# Patient Record
Sex: MALE | Race: WHITE | NOT HISPANIC OR LATINO | Employment: FULL TIME | URBAN - NONMETROPOLITAN AREA
[De-identification: names, ages, dates, MRNs, and addresses within clinical notes are randomized per-mention and may not be internally consistent; named-entity substitution may affect disease eponyms.]

---

## 2017-07-05 ENCOUNTER — OFFICE VISIT (OUTPATIENT)
Dept: FAMILY MEDICINE | Facility: CLINIC | Age: 37
End: 2017-07-05
Payer: COMMERCIAL

## 2017-07-05 VITALS
WEIGHT: 315 LBS | OXYGEN SATURATION: 97 % | TEMPERATURE: 97.7 F | SYSTOLIC BLOOD PRESSURE: 136 MMHG | HEART RATE: 78 BPM | BODY MASS INDEX: 42.01 KG/M2 | DIASTOLIC BLOOD PRESSURE: 90 MMHG

## 2017-07-05 DIAGNOSIS — F17.200 TOBACCO DEPENDENCE: ICD-10-CM

## 2017-07-05 DIAGNOSIS — M54.5 ACUTE BILATERAL LOW BACK PAIN, WITH SCIATICA PRESENCE UNSPECIFIED: Primary | ICD-10-CM

## 2017-07-05 DIAGNOSIS — E66.01 MORBID OBESITY, UNSPECIFIED OBESITY TYPE (H): ICD-10-CM

## 2017-07-05 PROCEDURE — 99214 OFFICE O/P EST MOD 30 MIN: CPT | Performed by: FAMILY MEDICINE

## 2017-07-05 RX ORDER — NAPROXEN 500 MG/1
500 TABLET ORAL
Qty: 90 TABLET | Refills: 1 | Status: SHIPPED | OUTPATIENT
Start: 2017-07-05 | End: 2023-12-06

## 2017-07-05 RX ORDER — CYCLOBENZAPRINE HCL 10 MG
5-10 TABLET ORAL 3 TIMES DAILY PRN
Qty: 30 TABLET | Refills: 1 | Status: SHIPPED | OUTPATIENT
Start: 2017-07-05 | End: 2017-08-08

## 2017-07-05 NOTE — NURSING NOTE
"Chief Complaint   Patient presents with     Back Pain       Initial /90  Pulse 78  Temp 97.7  F (36.5  C) (Tympanic)  Wt (!) 392 lb (177.8 kg)  SpO2 97%  BMI 42.01 kg/m2 Estimated body mass index is 42.01 kg/(m^2) as calculated from the following:    Height as of 3/30/10: 6' 9\" (2.057 m).    Weight as of this encounter: 392 lb (177.8 kg).  Medication Reconciliation: complete    "

## 2017-07-05 NOTE — PROGRESS NOTES
"  SUBJECTIVE:                                                    Christopher Mejia is a 37 year old male who presents to clinic today for the following health issues:      Back Pain       Duration: x4 days        Specific cause: fall     Description:   Location of pain: low back -middle, fell forward accidentally last weekend, no head injury or loss of consciousness  Character of pain: \"pulling\" intermittent   Pain radiation:radiates into the right leg and radiates into the left leg  New numbness or weakness in legs, not attributed to pain:  No; some weakness when pt tried to stand straight     Intensity: moderate (6/10 intensity)    History:   Pain interferes with job: YES,   History of back problems: yes, was in PT in December of 2016  Any previous MRI or X-rays: None  Sees a specialist for back pain:  No  Therapies tried without relief: IBU    Alleviating factors:   Improved by: NSAIDs and stretch      Precipitating factors:  Worsened by: Lifting and Standing    Functional and Psychosocial Screen (Soni STarT Back):      Not performed today          Accompanying Signs & Symptoms:  Risk of Fracture:  None  Risk of Cauda Equina:  None  Risk of Infection:  None  Risk of Cancer:  None  Risk of Ankylosing Spondylitis:  Onset at age <35, male, AND morning back stiffness. no         Problem list and histories reviewed & adjusted, as indicated.  Additional history: as documented    Patient Active Problem List   Diagnosis     Body mass index 40 and over, adult     CARDIOVASCULAR SCREENING; LDL GOAL LESS THAN 130     ELO (obstructive sleep apnea)     Gastroesophageal reflux disease without esophagitis     Morbid obesity (H)     Tobacco dependence     Past Surgical History:   Procedure Laterality Date     APPENDECTOMY  1989       Social History   Substance Use Topics     Smoking status: Current Every Day Smoker     Packs/day: 0.50     Types: Cigarettes     Smokeless tobacco: Not on file     Alcohol use Yes      Comment: " occ.     Family History   Problem Relation Age of Onset     CANCER Mother      thyroid     Respiratory Paternal Grandmother      Cardiovascular Paternal Grandmother      CANCER Paternal Grandfather          Current Outpatient Prescriptions   Medication Sig Dispense Refill     RANITIDINE HCL PO Take by mouth daily       naproxen (NAPROSYN) 500 MG tablet Take 1 tablet (500 mg) by mouth 3 times daily (with meals) 90 tablet 1     cyclobenzaprine (FLEXERIL) 10 MG tablet Take 0.5-1 tablets (5-10 mg) by mouth 3 times daily as needed for muscle spasms 30 tablet 1     IBUPROFEN 200 200 MG OR TABS 1 TABLET EVERY 4 TO 6 HOURS AS NEEDED       naproxen (NAPROSYN) 500 MG tablet Take 1 tablet (500 mg) by mouth 3 times daily (with meals) 30 tablet 1     PREVACID 15 MG OR CPDR 1 CAPSULE DAILY BEFORE EATING       Allergies   Allergen Reactions     Amoxicillin Hives     Recent Labs   Lab Test 05/13/16  03/30/10   1029   A1C   --   5.5   LDL  97  115   HDL  40  39*   TRIG  91  243*   CR   --   1.03   GFRESTIMATED   --   85   GFRESTBLACK   --   >90   POTASSIUM   --   4.7   TSH  2.359   --       BP Readings from Last 3 Encounters:   07/05/17 136/90   11/30/16 130/84   11/04/16 132/86    Wt Readings from Last 3 Encounters:   07/05/17 (!) 392 lb (177.8 kg)   11/30/16 (!) 384 lb (174.2 kg)   11/04/16 (!) 387 lb (175.5 kg)                  Labs reviewed in EPIC    Reviewed and updated as needed this visit by clinical staff       Reviewed and updated as needed this visit by Provider         ROS:  Constitutional, HEENT, cardiovascular, pulmonary, gi and gu systems are negative, except as otherwise noted.    OBJECTIVE:     /90  Pulse 78  Temp 97.7  F (36.5  C) (Tympanic)  Wt (!) 392 lb (177.8 kg)  SpO2 97%  BMI 42.01 kg/m2  Body mass index is 42.01 kg/(m^2).  GENERAL: alert, no distress and obese  NECK: no adenopathy, no asymmetry, masses, or scars and thyroid normal to palpation  RESP: lungs clear to auscultation - no rales, rhonchi  or wheezes  CV: regular rate and rhythm, normal S1 S2, no S3 or S4, no murmur, click or rub, no peripheral edema and peripheral pulses strong  ABDOMEN: soft, nontender, no hepatosplenomegaly, no masses and bowel sounds normal  MS: Mild lumbar paraspinal tenderness, no swelling or skin discoloration noted, SLR equal, reflexes 2+, sensation to touch and pressure intact  CNS: Grossly intact    ASSESSMENT/PLAN:           ICD-10-CM    1. Acute bilateral low back pain, with sciatica presence unspecified M54.5 naproxen (NAPROSYN) 500 MG tablet     cyclobenzaprine (FLEXERIL) 10 MG tablet     MR Lumbar Spine w/o Contrast   2. Morbid obesity, unspecified obesity type (H) E66.01    3. Tobacco dependence F17.200      MRI ordered in view of recurrent flare ups. Naproxen and Flexeril prescribed, common side effects discussed. Recommended to adapt healthy lifestyle modifications including regular exercise, balanced diet and stopping smoking habit. Discussed in detail significant cardiovascular risks involved with obesity and tobacco use. Written information provided as below. Patient understood and in agreement with the above plan. All questions answered.      MEDICATIONS:   Orders Placed This Encounter   Medications     RANITIDINE HCL PO     Sig: Take by mouth daily     naproxen (NAPROSYN) 500 MG tablet     Sig: Take 1 tablet (500 mg) by mouth 3 times daily (with meals)     Dispense:  90 tablet     Refill:  1     cyclobenzaprine (FLEXERIL) 10 MG tablet     Sig: Take 0.5-1 tablets (5-10 mg) by mouth 3 times daily as needed for muscle spasms     Dispense:  30 tablet     Refill:  1     Patient Instructions       Back Care Tips    Caring for your back  These are things you can do to prevent a recurrence of acute back pain and to reduce symptoms from chronic back pain:    Maintain a healthy weight. If you are overweight, losing weight will help most types of back pain.    Exercise is an important part of recovery from most types of  back pain. The muscles behind and in front of the spine support the back. This means strengthening both the back muscles and the abdominal muscles will provide better support for your spine.     Swimming and brisk walking are good overall exercises to improve your fitness level.    Practice safe lifting methods (below).    Practice good posture when sitting, standing and walking. Avoid prolonged sitting. This puts more stress on the lower back than standing or walking.    Wear quality shoes with sufficient arch support. Foot and ankle alignment can affect back symptoms. Women should avoid wearing high heels.    Therapeutic massage can help relax the back muscles without stretching them.    During the first 24 to 72 hours after an acute injury or flare-up of chronic back pain, apply an ice pack to the painful area for 20 minutes and then remove it for 20 minutes, over a period of 60 to 90 minutes, or several times a day. As a safety precaution, do not use a heating pad at bedtime. Sleeping on a heating pad can lead to skin burns or tissue damage.    You can alternate ice and heat therapies.  Medications  Talk to your healthcare provider before using medicines, especially if you have other medical problems or are taking other medicines.    You may use acetaminophen or ibuprofen to control pain, unless your healthcare provider prescribed other pain medicine. If you have chronic conditions like diabetes, liver or kidney disease, stomach ulcers, or gastrointestinal bleeding, or are taking blood thinners, talk with your healthcare provider before taking any medicines.    Be careful if you are given prescription pain medicines, narcotics, or medicine for muscle spasm. They can cause drowsiness, affect your coordination, reflexes, and judgment. Do not drive or operate heavy machinery while taking these types of medicines. Take prescription pain medicine only as prescribed by your healthcare provider.  Lumbar stretch  Here  is a simple stretching exercise that will help relax muscle spasm and keep your back more limber. If exercise makes your back pain worse, don t do it.    Lie on your back with your knees bent and both feet on the ground.    Slowly raise your left knee to your chest as you flatten your lower back against the floor. Hold for 5 seconds.    Relax and repeat the exercise with your right knee.    Do 10 of these exercises for each leg.  Safe lifting method    Don t bend over at the waist to lift an object off the floor.  Instead, bend your knees and hips in a squat.     Keep your back and head upright    Hold the object close to your body, directly in front of you.    Straighten your legs to lift the object.     Lower the object to the floor in the reverse fashion.    If you must slide something across the floor, push it.  Posture tips  Sitting  Sit in chairs with straight backs or low-back support. Keep your knees lower than your hips, with your feet flat on the floor.  When driving, sit up straight. Adjust the seat forward so you are not leaning toward the steering wheel.  A small pillow or rolled towel behind your lower back may help if you are driving long distances.   Standing  When standing for long periods, shift most of your weight to one leg at a time. Alternate legs every few minutes.   Sleeping  The best way to sleep is on your side with your knees bent. Put a low pillow under your head to support your neck in a neutral spine position. Avoid thick pillows that bend your neck to one side. Put a pillow between your legs to further relax your lower back. If you sleep on your back, put pillows under your knees to support your legs in a slightly flexed position. Use a firm mattress. If your mattress sags, replace it, or use a 1/2-inch plywood board under the mattress to add support.  Follow-up care  Follow up with your healthcare provider, or as advised.  If X-rays, a CT scan or an MRI scan were taken, they will be  reviewed by a radiologist. You will be notified of any new findings that may affect your care.  Call 911  Seek emergency medical care if any of the following occur:    Trouble breathing    Confusion    Very drowsy    Fainting or loss of consciousness    Rapid or very slow heart rate    Loss of  bowel or bladder control  When to seek medical care  Call your healthcare provider if any of the following occur:    Pain becomes worse or spreads to your arms or legs    Weakness or numbness in one or both arms or legs    Numbness in the groin area  Date Last Reviewed: 6/1/2016 2000-2017 [email or web address]. [address] [place] [address]. All rights reserved. This information is not intended as a substitute for professional medical care. Always follow your healthcare professional's instructions.        Relieving Back Pain  Back pain is a common problem. You can strain back muscles by lifting too much weight or just by moving the wrong way. Back strain can be uncomfortable, even painful. And it can take weeks or months to improve. To help yourself feel better and prevent future back strains, try these tips.  Important Note: Do not give aspirin to children or teens without first discussing it with your healthcare provider.      ? Ice    Ice reduces muscle pain and swelling. It helps most during the first 24 to 48 hours after an injury.    Wrap an ice pack or a bag of frozen peas in a thin towel. (Never place ice directly on your skin.)    Place the ice where your back hurts the most.    Don t ice for more than 20 minutes at a time.    You can use ice several times a day.  ? Medicines  Over-the-counter pain relievers can include acetaminophen and anti-inflammatory medicines, which includes aspirin or ibuprofen. They can help ease discomfort. Some also reduce swelling.    Tell your healthcare provider about any medicines you are already taking.    Take medicines only as directed.  ? Heat  After the first 48  hours, heat can relax sore muscles and improve blood flow.    Try a warm bath or shower. Or use a heating pad set on low. To prevent a burn, keep a cloth between you and the heating pad.    Don t use a heating pad for more than 15 minutes at a time. Never sleep on a heating pad.  Date Last Reviewed: 9/1/2015 2000-2017 [employer]. [address] [place] [address], [place] [address]. All rights reserved. This information is not intended as a substitute for professional medical care. Always follow your healthcare professional's instructions.        4 Steps for Eating Healthier  Changing the way you eat can improve your health. It can lower your cholesterol and blood pressure, and help you stay at a healthy weight. Your diet doesn t have to be bland and boring to be healthy. Just watch your calories and follow these steps:    1. Eat fewer unhealthy fats    Choose more fish and lean meats instead of fatty cuts of meat.    Skip butter and lard, and use less margarine.    Pass on foods that have palm, coconut, or hydrogenated oils.    Eat fewer high-fat dairy foods like cheese, ice cream, and whole milk.    Get a heart-healthy cookbook and try some low-fat recipes.  2. Go light on salt    Keep the saltshaker off the table.    Limit high-salt ingredients, such as soy sauce, bouillon, and garlic salt.    Instead of adding salt when cooking, season your food with herbs and flavorings. Try lemon, garlic, and onion.    Limit convenience foods, such as boxed or canned foods and restaurant food.    Read food labels and choose lower-sodium options.  3. Limit sugar    Pause before you add sugars to pancakes, cereal, coffee, or tea. This includes white and brown table sugar, syrup, honey, and molasses. Cut your usual amount by half.    Use non-sugar sweeteners. Stevia, aspartame, and sucralose can satisfy a sweet tooth without adding calories.    Swap out sugar-filled soda and other drinks. Buy sugar-free or low-calorie  beverages. Remember water is always the best choice.    Read labels and choose foods with less added sugar. Keep in mind that dairy foods and foods with fruit will have some natural sugar.    Cut the sugar in recipes by 1/3 to 1/2. Boost the flavor with extracts like almond, vanilla, or orange. Or add spices such as cinnamon or nutmeg.  4. Eat more fiber    Eat fresh fruits and vegetables every day.    Boost your diet with whole grains. Go for oats, whole-grain rice, and bran.    Add beans and lentils to your meals.    Drink more water to match your fiber increase. This is to help prevent constipation.  Date Last Reviewed: 5/11/2015 2000-2017 [employer]. [address] [place] [address], [place] [address]. All rights reserved. This information is not intended as a substitute for professional medical care. Always follow your healthcare professional's instructions.        Weight Management: Exercise and Activity  Studies show that people who exercise are the most likely to lose weight and keep it off. Exercise burns calories. It helps build muscle to make your body stronger. Make exercise an important part of your weight-management plan.    Make activity part of your day  You may not think you have the time to exercise. But you can work activity into your daily life--you just need to be committed. Take 10 minutes out of your lunch hour to take a walk. Walk to the [employer] to get your paper instead of having it delivered. Make it a habit to take the stairs instead of the elevator. Park in a far away parking spot instead of the closest. You ll be surprised at how fast these little changes can make a difference.  Some people really cannot walk very far, and tire out quickly with exercise. Instead of becoming discouraged, resolve to do what you can do, and work to make that a regular frequent habit.   The benefits of exercise  Exercise offers many benefits including:     Exercise increases your metabolism (the  speed at which your body burns calories).    Regular exercise can increase the amount of muscle in your body. Muscle burns calories faster than fat. The more muscle you have, the more calories you burn.    Exercise gives you energy and curbs your appetite.    Exercise decreases stress and helps you sleep better.  Make exercise fun  Exercise can be fun. Choose an activity you enjoy. You may even get a friend to do it with you:    Take a resistance-training or aerobics class    Join a team sport    Take a dance class    Walk the dog    Ride a bike  If you have health problems, be sure to ask your healthcare provider before you start an exercise program. Have a  help you develop a plan that s safe for you.   Date Last Reviewed: 2/4/2016 2000-2017 [employer]. [address] [place] [address], [place] [address]. All rights reserved. This information is not intended as a substitute for professional medical care. Always follow your healthcare professional's instructions.        How to Quit Smoking  Smoking is one of the hardest habits to break. About half of all people who have ever smoked have been able to quit. Most people who still smoke want to quit. Here are some of the best ways to stop smoking.    Keep trying  It takes most smokers about eight tries before they can quit entirely. It s important not to give up.  Go cold turkey  Most former smokers quit cold turkey (all at once). Trying to cut back gradually doesn't seem to work as well, perhaps because it continues the smoking habit. Also, it is possible to inhale more while smoking fewer cigarettes. This results in the same amount of nicotine in your body!  Get support  Support programs can be a big help, especially for heavy smokers. These groups offer lectures, ways to change behavior, and peer support. Here are some ways to find a support program:    Free national quitline: 800QUIT-NOW (792-939-9750).    [facility] quit-smoking  "programs.    American Lung Association: (476.286.5858).    American Cancer Society (744-009-3487).  Support at home is important too. Nonsmokers can offer praise and encouragement. If the smoker in your life finds it hard to quit, encourage them to keep trying!  Over-the-counter medicines  Nicotine replacement therapy may make quitting easier. Certain aids, such as the nicotine patch, gum, and lozenges, are available without a prescription. It is best to use these under a doctor s care, though. The skin patch provides a steady supply of nicotine. Nicotine gum and lozenges give temporary bursts of low levels of nicotine. Both methods reduce the craving for cigarettes. Warning: If you have nausea, vomiting, dizziness, weakness, or a fast heartbeat, stop using these products and see your doctor.  Prescription medicines  After reviewing your smoking patterns and prior attempts to quit, your doctor may offer a prescription medicine such as bupropion, varenicline, a nicotine inhaler, or nasal spray. Each has advantages and side effects. Your doctor can review these with you.  Health benefits of quitting  The benefits of quitting start right away and keep improving the longer you go without smoking. These benefits occur at any age.  So whether you are 17 or 70, quitting is a good decision. Some of the benefits include:    20 minutes: Blood pressure and pulse return to normal.    8 hours: Oxygen levels return to normal.    2 days: Ability to smell and taste begin to improve as damaged nerves regrow.    2 to 3 weeks: Circulation and lung function improve.    1 to 9 months: Coughing, congestion, and shortness of breath decrease; tiredness decreases.    1 year: Risk of heart attack decreases by half.    5 years: Risk of lung cancer decreases by half; risk of stroke becomes the same as a nonsmoker s.  For more on how to quit smoking, try these online resources:     Smokefree.gov    \"Clearing the Air\" booklet from the National " Cancer [employer]: smokefree.gov/sites/default/files/pdf/clearing-the-air-accessible.pdf  Date Last Reviewed: 4/28/2015 2000-2017 [employer]. [address] [place] [address], [place] [address]. All rights reserved. This information is not intended as a substitute for professional medical care. Always follow your healthcare professional's instructions.            Von Cruz MD  [facility]

## 2017-07-05 NOTE — MR AVS SNAPSHOT
After Visit Summary   7/5/2017    Christopher eMjia    MRN: [id number]           Patient Information     Date Of Birth          1980        Visit Information        Provider Department      7/5/2017 9:40 AM Von Cruz MD [facility]        Today's Diagnoses     Acute bilateral low back pain, with sciatica presence unspecified    -  1    Morbid obesity, unspecified obesity type (H)        Tobacco dependence          Care Instructions      Back Care Tips    Caring for your back  These are things you can do to prevent a recurrence of acute back pain and to reduce symptoms from chronic back pain:    Maintain a healthy weight. If you are overweight, losing weight will help most types of back pain.    Exercise is an important part of recovery from most types of back pain. The muscles behind and in front of the spine support the back. This means strengthening both the back muscles and the abdominal muscles will provide better support for your spine.     Swimming and brisk walking are good overall exercises to improve your fitness level.    Practice safe lifting methods (below).    Practice good posture when sitting, standing and walking. Avoid prolonged sitting. This puts more stress on the lower back than standing or walking.    Wear quality shoes with sufficient arch support. Foot and ankle alignment can affect back symptoms. Women should avoid wearing high heels.    Therapeutic massage can help relax the back muscles without stretching them.    During the first 24 to 72 hours after an acute injury or flare-up of chronic back pain, apply an ice pack to the painful area for 20 minutes and then remove it for 20 minutes, over a period of 60 to 90 minutes, or several times a day. As a safety precaution, do not use a heating pad at bedtime. Sleeping on a heating pad can lead to skin burns or tissue damage.    You can alternate ice and heat therapies.  Medications  Talk to your  healthcare provider before using medicines, especially if you have other medical problems or are taking other medicines.    You may use acetaminophen or ibuprofen to control pain, unless your healthcare provider prescribed other pain medicine. If you have chronic conditions like diabetes, liver or kidney disease, stomach ulcers, or gastrointestinal bleeding, or are taking blood thinners, talk with your healthcare provider before taking any medicines.    Be careful if you are given prescription pain medicines, narcotics, or medicine for muscle spasm. They can cause drowsiness, affect your coordination, reflexes, and judgment. Do not drive or operate heavy machinery while taking these types of medicines. Take prescription pain medicine only as prescribed by your healthcare provider.  Lumbar stretch  Here is a simple stretching exercise that will help relax muscle spasm and keep your back more limber. If exercise makes your back pain worse, don t do it.    Lie on your back with your knees bent and both feet on the ground.    Slowly raise your left knee to your chest as you flatten your lower back against the floor. Hold for 5 seconds.    Relax and repeat the exercise with your right knee.    Do 10 of these exercises for each leg.  Safe lifting method    Don t bend over at the waist to lift an object off the floor.  Instead, bend your knees and hips in a squat.     Keep your back and head upright    Hold the object close to your body, directly in front of you.    Straighten your legs to lift the object.     Lower the object to the floor in the reverse fashion.    If you must slide something across the floor, push it.  Posture tips  Sitting  Sit in chairs with straight backs or low-back support. Keep your knees lower than your hips, with your feet flat on the floor.  When driving, sit up straight. Adjust the seat forward so you are not leaning toward the steering wheel.  A small pillow or rolled towel behind your lower  back may help if you are driving long distances.   Standing  When standing for long periods, shift most of your weight to one leg at a time. Alternate legs every few minutes.   Sleeping  The best way to sleep is on your side with your knees bent. Put a low pillow under your head to support your neck in a neutral spine position. Avoid thick pillows that bend your neck to one side. Put a pillow between your legs to further relax your lower back. If you sleep on your back, put pillows under your knees to support your legs in a slightly flexed position. Use a firm mattress. If your mattress sags, replace it, or use a 1/2-inch plywood board under the mattress to add support.  Follow-up care  Follow up with your healthcare provider, or as advised.  If X-rays, a CT scan or an MRI scan were taken, they will be reviewed by a radiologist. You will be notified of any new findings that may affect your care.  Call 911  Seek emergency medical care if any of the following occur:    Trouble breathing    Confusion    Very drowsy    Fainting or loss of consciousness    Rapid or very slow heart rate    Loss of  bowel or bladder control  When to seek medical care  Call your healthcare provider if any of the following occur:    Pain becomes worse or spreads to your arms or legs    Weakness or numbness in one or both arms or legs    Numbness in the groin area  Date Last Reviewed: 6/1/2016 2000-2017 [employer]. [address] [place] [address]. All rights reserved. This information is not intended as a substitute for professional medical care. Always follow your healthcare professional's instructions.        Relieving Back Pain  Back pain is a common problem. You can strain back muscles by lifting too much weight or just by moving the wrong way. Back strain can be uncomfortable, even painful. And it can take weeks or months to improve. To help yourself feel better and prevent future back strains, try these  tips.  Important Note: Do not give aspirin to children or teens without first discussing it with your healthcare provider.      ? Ice    Ice reduces muscle pain and swelling. It helps most during the first 24 to 48 hours after an injury.    Wrap an ice pack or a bag of frozen peas in a thin towel. (Never place ice directly on your skin.)    Place the ice where your back hurts the most.    Don t ice for more than 20 minutes at a time.    You can use ice several times a day.  ? Medicines  Over-the-counter pain relievers can include acetaminophen and anti-inflammatory medicines, which includes aspirin or ibuprofen. They can help ease discomfort. Some also reduce swelling.    Tell your healthcare provider about any medicines you are already taking.    Take medicines only as directed.  ? Heat  After the first 48 hours, heat can relax sore muscles and improve blood flow.    Try a warm bath or shower. Or use a heating pad set on low. To prevent a burn, keep a cloth between you and the heating pad.    Don t use a heating pad for more than 15 minutes at a time. Never sleep on a heating pad.  Date Last Reviewed: 9/1/2015 2000-2017 [employer]. [address] [place] [address]. All rights reserved. This information is not intended as a substitute for professional medical care. Always follow your healthcare professional's instructions.        4 Steps for Eating Healthier  Changing the way you eat can improve your health. It can lower your cholesterol and blood pressure, and help you stay at a healthy weight. Your diet doesn t have to be bland and boring to be healthy. Just watch your calories and follow these steps:    1. Eat fewer unhealthy fats    Choose more fish and lean meats instead of fatty cuts of meat.    Skip butter and lard, and use less margarine.    Pass on foods that have palm, coconut, or hydrogenated oils.    Eat fewer high-fat dairy foods like cheese, ice cream, and whole milk.    Get  a heart-healthy cookbook and try some low-fat recipes.  2. Go light on salt    Keep the saltshaker off the table.    Limit high-salt ingredients, such as soy sauce, bouillon, and garlic salt.    Instead of adding salt when cooking, season your food with herbs and flavorings. Try lemon, garlic, and onion.    Limit convenience foods, such as boxed or canned foods and restaurant food.    Read food labels and choose lower-sodium options.  3. Limit sugar    Pause before you add sugars to pancakes, cereal, coffee, or tea. This includes white and brown table sugar, syrup, honey, and molasses. Cut your usual amount by half.    Use non-sugar sweeteners. Stevia, aspartame, and sucralose can satisfy a sweet tooth without adding calories.    Swap out sugar-filled soda and other drinks. Buy sugar-free or low-calorie beverages. Remember water is always the best choice.    Read labels and choose foods with less added sugar. Keep in mind that dairy foods and foods with fruit will have some natural sugar.    Cut the sugar in recipes by 1/3 to 1/2. Boost the flavor with extracts like almond, vanilla, or orange. Or add spices such as cinnamon or nutmeg.  4. Eat more fiber    Eat fresh fruits and vegetables every day.    Boost your diet with whole grains. Go for oats, whole-grain rice, and bran.    Add beans and lentils to your meals.    Drink more water to match your fiber increase. This is to help prevent constipation.  Date Last Reviewed: 5/11/2015 2000-2017 [employer]. [address] [place] [address], [place] [address]. All rights reserved. This information is not intended as a substitute for professional medical care. Always follow your healthcare professional's instructions.        Weight Management: Exercise and Activity  Studies show that people who exercise are the most likely to lose weight and keep it off. Exercise burns calories. It helps build muscle to make your body stronger. Make exercise an important part of  your weight-management plan.    Make activity part of your day  You may not think you have the time to exercise. But you can work activity into your daily life--you just need to be committed. Take 10 minutes out of your lunch hour to take a walk. Walk to the [employer] to get your paper instead of having it delivered. Make it a habit to take the stairs instead of the elevator. Park in a far away parking spot instead of the closest. You ll be surprised at how fast these little changes can make a difference.  Some people really cannot walk very far, and tire out quickly with exercise. Instead of becoming discouraged, resolve to do what you can do, and work to make that a regular frequent habit.   The benefits of exercise  Exercise offers many benefits including:     Exercise increases your metabolism (the speed at which your body burns calories).    Regular exercise can increase the amount of muscle in your body. Muscle burns calories faster than fat. The more muscle you have, the more calories you burn.    Exercise gives you energy and curbs your appetite.    Exercise decreases stress and helps you sleep better.  Make exercise fun  Exercise can be fun. Choose an activity you enjoy. You may even get a friend to do it with you:    Take a resistance-training or aerobics class    Join a team sport    Take a dance class    Walk the dog    Ride a bike  If you have health problems, be sure to ask your healthcare provider before you start an exercise program. Have a  help you develop a plan that s safe for you.   Date Last Reviewed: 2/4/2016 2000-2017 [employer]. [address] [place] [address], [place] [address]. All rights reserved. This information is not intended as a substitute for professional medical care. Always follow your healthcare professional's instructions.        How to Quit Smoking  Smoking is one of the hardest habits to break. About half of all people who have ever smoked have  been able to quit. Most people who still smoke want to quit. Here are some of the best ways to stop smoking.    Keep trying  It takes most smokers about eight tries before they can quit entirely. It s important not to give up.  Go cold turkey  Most former smokers quit cold turkey (all at once). Trying to cut back gradually doesn't seem to work as well, perhaps because it continues the smoking habit. Also, it is possible to inhale more while smoking fewer cigarettes. This results in the same amount of nicotine in your body!  Get support  Support programs can be a big help, especially for heavy smokers. These groups offer lectures, ways to change behavior, and peer support. Here are some ways to find a support program:    Free national quitline: 800-QUIT-NOW (577-938-3870).    [facility] quit-smoking programs.    American Lung Association: (585.519.5050).    American Cancer Society (664-222-1850).  Support at home is important too. Nonsmokers can offer praise and encouragement. If the smoker in your life finds it hard to quit, encourage them to keep trying!  Over-the-counter medicines  Nicotine replacement therapy may make quitting easier. Certain aids, such as the nicotine patch, gum, and lozenges, are available without a prescription. It is best to use these under a doctor s care, though. The skin patch provides a steady supply of nicotine. Nicotine gum and lozenges give temporary bursts of low levels of nicotine. Both methods reduce the craving for cigarettes. Warning: If you have nausea, vomiting, dizziness, weakness, or a fast heartbeat, stop using these products and see your doctor.  Prescription medicines  After reviewing your smoking patterns and prior attempts to quit, your doctor may offer a prescription medicine such as bupropion, varenicline, a nicotine inhaler, or nasal spray. Each has advantages and side effects. Your doctor can review these with you.  Health benefits of quitting  The benefits of quitting  "start right away and keep improving the longer you go without smoking. These benefits occur at any age.  So whether you are 17 or 70, quitting is a good decision. Some of the benefits include:    20 minutes: Blood pressure and pulse return to normal.    8 hours: Oxygen levels return to normal.    2 days: Ability to smell and taste begin to improve as damaged nerves regrow.    2 to 3 weeks: Circulation and lung function improve.    1 to 9 months: Coughing, congestion, and shortness of breath decrease; tiredness decreases.    1 year: Risk of heart attack decreases by half.    5 years: Risk of lung cancer decreases by half; risk of stroke becomes the same as a nonsmoker s.  For more on how to quit smoking, try these online resources:     Smokefree.gov    \"Clearing the Air\" booklet from the National [employer]yer: smokefree.gov/sites/default/files/pdf/clearing-the-air-accessible.pdf  Date Last Reviewed: 4/28/2015 2000-2017 [employer]. [address] [place] [address]. All rights reserved. This information is not intended as a substitute for professional medical care. Always follow your healthcare professional's instructions.                Follow-ups after your visit        Future tests that were ordered for you today     Open Future Orders        Priority Expected Expires Ordered    MR Lumbar Spine w/o Contrast Routine  7/5/2018 7/5/2017            Who to contact     If you have questions or need follow up information about today's clinic visit or your schedule please contact [facility] directly at [phone].  Normal or non-critical lab and imaging results will be communicated to you by MyChart, letter or phone within 4 business days after the clinic has received the results. If you do not hear from us within 7 days, please contact the clinic through MyChart or phone. If you have a critical or abnormal lab result, we will notify you by phone as soon as possible.  Submit " "refill requests through [employer] or call your pharmacy and they will forward the refill request to us. Please allow 3 business days for your refill to be completed.          Additional Information About Your Visit        [employer] Information     [employer] lets you send messages to your doctor, view your test results, renew your prescriptions, schedule appointments and more. To sign up, go to [email or web address] . Click on \"Log in\" on the left side of the screen, which will take you to the Welcome page. Then click on \"Sign up Now\" on the right side of the page.     You will be asked to enter the access code listed below, as well as some personal information. Please follow the directions to create your username and password.     Your access code is: [email or web address]  Expires: 10/3/2017 10:08 AM     Your access code will  in 90 days. If you need help or a new code, please call your [place] clinic or [phone].        Care EveryWhere ID     This is your Care EveryWhere ID. This could be used by other organizations to access your [place] medical records  [id number]        Your Vitals Were     Pulse Temperature Pulse Oximetry BMI (Body Mass Index)          78 97.7  F (36.5  C) (Tympanic) 97% 42.01 kg/m2         Blood Pressure from Last 3 Encounters:   17 136/90   16 130/84   16 132/86    Weight from Last 3 Encounters:   17 (!) 392 lb (177.8 kg)   16 (!) 384 lb (174.2 kg)   16 (!) 387 lb (175.5 kg)                 Today's Medication Changes          These changes are accurate as of: 17 10:08 AM.  If you have any questions, ask your nurse or doctor.               Start taking these medicines.        Dose/Directions    cyclobenzaprine 10 MG tablet   Commonly known as:  FLEXERIL   Used for:  Acute bilateral low back pain, with sciatica presence unspecified        Dose:  5-10 mg   Take 0.5-1 tablets (5-10 mg) by mouth 3 times daily as needed for muscle spasms   Quantity:  30 " tablet   Refills:  1         These medicines have changed or have updated prescriptions.        Dose/Directions    * naproxen 500 MG tablet   Commonly known as:  NAPROSYN   This may have changed:  Another medication with the same name was added. Make sure you understand how and when to take each.   Used for:  Bilateral low back pain without sciatica, unspecified chronicity        Dose:  500 mg   Take 1 tablet (500 mg) by mouth 3 times daily (with meals)   Quantity:  30 tablet   Refills:  1       * naproxen 500 MG tablet   Commonly known as:  NAPROSYN   This may have changed:  You were already taking a medication with the same name, and this prescription was added. Make sure you understand how and when to take each.   Used for:  Acute bilateral low back pain, with sciatica presence unspecified        Dose:  500 mg   Take 1 tablet (500 mg) by mouth 3 times daily (with meals)   Quantity:  90 tablet   Refills:  1       * Notice:  This list has 2 medication(s) that are the same as other medications prescribed for you. Read the directions carefully, and ask your doctor or other care provider to review them with you.         Where to get your medicines      These medications were sent to [id number] - [place], [place] - [address]  [address] [facility] [address] [address]     Phone:  [phone]     cyclobenzaprine 10 MG tablet    naproxen 500 MG tablet                Primary Care Provider Office Phone # Fax #    Gonzales Villalpando [phone]       [facility] [address] [facility] [address]        Equal Access to Services     [facility] AH: Hadii josué barbouro Sosally, waaxda luqadaha, qaybta kaalmada adeegyada, salvador benites. So [facility] [phone].    ATENCIÓN: Si habla español, tiene a matos disposición servicios gratuitos de asistencia lingüística. Jennifer al [phone].    We comply with applicable federal civil rights laws and  [place] laws. We do not discriminate on the basis of race, color, national origin, age, disability sex, sexual orientation or gender identity.            Thank you!     Thank you for choosing [facility]  for your care. Our goal is always to provide you with excellent care. Hearing back from our patients is one way we can continue to improve our services. Please take a few minutes to complete the written survey that you may receive in the mail after your visit with us. Thank you!             Your Updated Medication List - Protect others around you: Learn how to safely use, store and throw away your medicines at www.disposemymeds.org.          This list is accurate as of: 7/5/17 10:08 AM.  Always use your most recent med list.                   Brand Name Dispense Instructions for use Diagnosis    cyclobenzaprine 10 MG tablet    FLEXERIL    30 tablet    Take 0.5-1 tablets (5-10 mg) by mouth 3 times daily as needed for muscle spasms    Acute bilateral low back pain, with sciatica presence unspecified       IBUPROFEN 200 200 MG Tabs      1 TABLET EVERY 4 TO 6 HOURS AS NEEDED        * naproxen 500 MG tablet    NAPROSYN    30 tablet    Take 1 tablet (500 mg) by mouth 3 times daily (with meals)    Bilateral low back pain without sciatica, unspecified chronicity       * naproxen 500 MG tablet    NAPROSYN    90 tablet    Take 1 tablet (500 mg) by mouth 3 times daily (with meals)    Acute bilateral low back pain, with sciatica presence unspecified       PREVACID 15 MG CR capsule   Generic drug:  LANsoprazole      1 CAPSULE DAILY BEFORE EATING        RANITIDINE HCL PO      Take by mouth daily        * Notice:  This list has 2 medication(s) that are the same as other medications prescribed for you. Read the directions carefully, and ask your doctor or other care provider to review them with you.

## 2017-07-05 NOTE — LETTER
[facility]  [address] [facility] [address]  [phone]    July 5, 2017        Christopher Mejia  [address] [address]        To whom it may concern:          Christopher Mejia was seen on July 5, 2017. Kindly excuse his absence on 07/02, 07/05 and 07/06/2017. He may can return to work on 07/07/2017 if feels better clinically.         Please contact me for questions or concerns.          Sincerely,          Von Cruz MD

## 2017-07-05 NOTE — PATIENT INSTRUCTIONS
Back Care Tips    Caring for your back  These are things you can do to prevent a recurrence of acute back pain and to reduce symptoms from chronic back pain:    Maintain a healthy weight. If you are overweight, losing weight will help most types of back pain.    Exercise is an important part of recovery from most types of back pain. The muscles behind and in front of the spine support the back. This means strengthening both the back muscles and the abdominal muscles will provide better support for your spine.     Swimming and brisk walking are good overall exercises to improve your fitness level.    Practice safe lifting methods (below).    Practice good posture when sitting, standing and walking. Avoid prolonged sitting. This puts more stress on the lower back than standing or walking.    Wear quality shoes with sufficient arch support. Foot and ankle alignment can affect back symptoms. Women should avoid wearing high heels.    Therapeutic massage can help relax the back muscles without stretching them.    During the first 24 to 72 hours after an acute injury or flare-up of chronic back pain, apply an ice pack to the painful area for 20 minutes and then remove it for 20 minutes, over a period of 60 to 90 minutes, or several times a day. As a safety precaution, do not use a heating pad at bedtime. Sleeping on a heating pad can lead to skin burns or tissue damage.    You can alternate ice and heat therapies.  Medications  Talk to your healthcare provider before using medicines, especially if you have other medical problems or are taking other medicines.    You may use acetaminophen or ibuprofen to control pain, unless your healthcare provider prescribed other pain medicine. If you have chronic conditions like diabetes, liver or kidney disease, stomach ulcers, or gastrointestinal bleeding, or are taking blood thinners, talk with your healthcare provider before taking any medicines.    Be careful if you are given  prescription pain medicines, narcotics, or medicine for muscle spasm. They can cause drowsiness, affect your coordination, reflexes, and judgment. Do not drive or operate heavy machinery while taking these types of medicines. Take prescription pain medicine only as prescribed by your healthcare provider.  Lumbar stretch  Here is a simple stretching exercise that will help relax muscle spasm and keep your back more limber. If exercise makes your back pain worse, don t do it.    Lie on your back with your knees bent and both feet on the ground.    Slowly raise your left knee to your chest as you flatten your lower back against the floor. Hold for 5 seconds.    Relax and repeat the exercise with your right knee.    Do 10 of these exercises for each leg.  Safe lifting method    Don t bend over at the waist to lift an object off the floor.  Instead, bend your knees and hips in a squat.     Keep your back and head upright    Hold the object close to your body, directly in front of you.    Straighten your legs to lift the object.     Lower the object to the floor in the reverse fashion.    If you must slide something across the floor, push it.  Posture tips  Sitting  Sit in chairs with straight backs or low-back support. Keep your knees lower than your hips, with your feet flat on the floor.  When driving, sit up straight. Adjust the seat forward so you are not leaning toward the steering wheel.  A small pillow or rolled towel behind your lower back may help if you are driving long distances.   Standing  When standing for long periods, shift most of your weight to one leg at a time. Alternate legs every few minutes.   Sleeping  The best way to sleep is on your side with your knees bent. Put a low pillow under your head to support your neck in a neutral spine position. Avoid thick pillows that bend your neck to one side. Put a pillow between your legs to further relax your lower back. If you sleep on your back, put pillows  under your knees to support your legs in a slightly flexed position. Use a firm mattress. If your mattress sags, replace it, or use a 1/2-inch plywood board under the mattress to add support.  Follow-up care  Follow up with your healthcare provider, or as advised.  If X-rays, a CT scan or an MRI scan were taken, they will be reviewed by a radiologist. You will be notified of any new findings that may affect your care.  Call 911  Seek emergency medical care if any of the following occur:    Trouble breathing    Confusion    Very drowsy    Fainting or loss of consciousness    Rapid or very slow heart rate    Loss of  bowel or bladder control  When to seek medical care  Call your healthcare provider if any of the following occur:    Pain becomes worse or spreads to your arms or legs    Weakness or numbness in one or both arms or legs    Numbness in the groin area  Date Last Reviewed: 6/1/2016 2000-2017 [employer]. [address] [place] [address]. All rights reserved. This information is not intended as a substitute for professional medical care. Always follow your healthcare professional's instructions.        Relieving Back Pain  Back pain is a common problem. You can strain back muscles by lifting too much weight or just by moving the wrong way. Back strain can be uncomfortable, even painful. And it can take weeks or months to improve. To help yourself feel better and prevent future back strains, try these tips.  Important Note: Do not give aspirin to children or teens without first discussing it with your healthcare provider.      ? Ice    Ice reduces muscle pain and swelling. It helps most during the first 24 to 48 hours after an injury.    Wrap an ice pack or a bag of frozen peas in a thin towel. (Never place ice directly on your skin.)    Place the ice where your back hurts the most.    Don t ice for more than 20 minutes at a time.    You can use ice several times a  day.  ? Medicines  Over-the-counter pain relievers can include acetaminophen and anti-inflammatory medicines, which includes aspirin or ibuprofen. They can help ease discomfort. Some also reduce swelling.    Tell your healthcare provider about any medicines you are already taking.    Take medicines only as directed.  ? Heat  After the first 48 hours, heat can relax sore muscles and improve blood flow.    Try a warm bath or shower. Or use a heating pad set on low. To prevent a burn, keep a cloth between you and the heating pad.    Don t use a heating pad for more than 15 minutes at a time. Never sleep on a heating pad.  Date Last Reviewed: 9/1/2015 2000-2017 [employer]. [address] [place] [address], [place] [address]. All rights reserved. This information is not intended as a substitute for professional medical care. Always follow your healthcare professional's instructions.        4 Steps for Eating Healthier  Changing the way you eat can improve your health. It can lower your cholesterol and blood pressure, and help you stay at a healthy weight. Your diet doesn t have to be bland and boring to be healthy. Just watch your calories and follow these steps:    1. Eat fewer unhealthy fats    Choose more fish and lean meats instead of fatty cuts of meat.    Skip butter and lard, and use less margarine.    Pass on foods that have palm, coconut, or hydrogenated oils.    Eat fewer high-fat dairy foods like cheese, ice cream, and whole milk.    Get a heart-healthy cookbook and try some low-fat recipes.  2. Go light on salt    Keep the saltshaker off the table.    Limit high-salt ingredients, such as soy sauce, bouillon, and garlic salt.    Instead of adding salt when cooking, season your food with herbs and flavorings. Try lemon, garlic, and onion.    Limit convenience foods, such as boxed or canned foods and restaurant food.    Read food labels and choose lower-sodium options.  3. Limit sugar    Pause before you  add sugars to pancakes, cereal, coffee, or tea. This includes white and brown table sugar, syrup, honey, and molasses. Cut your usual amount by half.    Use non-sugar sweeteners. Stevia, aspartame, and sucralose can satisfy a sweet tooth without adding calories.    Swap out sugar-filled soda and other drinks. Buy sugar-free or low-calorie beverages. Remember water is always the best choice.    Read labels and choose foods with less added sugar. Keep in mind that dairy foods and foods with fruit will have some natural sugar.    Cut the sugar in recipes by 1/3 to 1/2. Boost the flavor with extracts like almond, vanilla, or orange. Or add spices such as cinnamon or nutmeg.  4. Eat more fiber    Eat fresh fruits and vegetables every day.    Boost your diet with whole grains. Go for oats, whole-grain rice, and bran.    Add beans and lentils to your meals.    Drink more water to match your fiber increase. This is to help prevent constipation.  Date Last Reviewed: 5/11/2015 2000-2017 [employer]. [address] [place] [address]. All rights reserved. This information is not intended as a substitute for professional medical care. Always follow your healthcare professional's instructions.        Weight Management: Exercise and Activity  Studies show that people who exercise are the most likely to lose weight and keep it off. Exercise burns calories. It helps build muscle to make your body stronger. Make exercise an important part of your weight-management plan.    Make activity part of your day  You may not think you have the time to exercise. But you can work activity into your daily life--you just need to be committed. Take 10 minutes out of your lunch hour to take a walk. Walk to the [facility] to get your paper instead of having it delivered. Make it a habit to take the stairs instead of the elevator. Park in a far away parking spot instead of the closest. You ll be surprised at how fast these  little changes can make a difference.  Some people really cannot walk very far, and tire out quickly with exercise. Instead of becoming discouraged, resolve to do what you can do, and work to make that a regular frequent habit.   The benefits of exercise  Exercise offers many benefits including:     Exercise increases your metabolism (the speed at which your body burns calories).    Regular exercise can increase the amount of muscle in your body. Muscle burns calories faster than fat. The more muscle you have, the more calories you burn.    Exercise gives you energy and curbs your appetite.    Exercise decreases stress and helps you sleep better.  Make exercise fun  Exercise can be fun. Choose an activity you enjoy. You may even get a friend to do it with you:    Take a resistance-training or aerobics class    Join a team sport    Take a dance class    Walk the dog    Ride a bike  If you have health problems, be sure to ask your healthcare provider before you start an exercise program. Have a  help you develop a plan that s safe for you.   Date Last Reviewed: 2/4/2016 2000-2017 [employer]. [address] [place] [address]. All rights reserved. This information is not intended as a substitute for professional medical care. Always follow your healthcare professional's instructions.        How to Quit Smoking  Smoking is one of the hardest habits to break. About half of all people who have ever smoked have been able to quit. Most people who still smoke want to quit. Here are some of the best ways to stop smoking.    Keep trying  It takes most smokers about eight tries before they can quit entirely. It s important not to give up.  Go cold turkey  Most former smokers quit cold turkey (all at once). Trying to cut back gradually doesn't seem to work as well, perhaps because it continues the smoking habit. Also, it is possible to inhale more while smoking fewer cigarettes. This  results in the same amount of nicotine in your body!  Get support  Support programs can be a big help, especially for heavy smokers. These groups offer lectures, ways to change behavior, and peer support. Here are some ways to find a support program:    Free national quitline: 800-QUIT-NOW (214-427-4710).    [facility] quit-smoking programs.    American Lung Association: (415.775.1020).    American Cancer Society (288-243-4680).  Support at home is important too. Nonsmokers can offer praise and encouragement. If the smoker in your life finds it hard to quit, encourage them to keep trying!  Over-the-counter medicines  Nicotine replacement therapy may make quitting easier. Certain aids, such as the nicotine patch, gum, and lozenges, are available without a prescription. It is best to use these under a doctor s care, though. The skin patch provides a steady supply of nicotine. Nicotine gum and lozenges give temporary bursts of low levels of nicotine. Both methods reduce the craving for cigarettes. Warning: If you have nausea, vomiting, dizziness, weakness, or a fast heartbeat, stop using these products and see your doctor.  Prescription medicines  After reviewing your smoking patterns and prior attempts to quit, your doctor may offer a prescription medicine such as bupropion, varenicline, a nicotine inhaler, or nasal spray. Each has advantages and side effects. Your doctor can review these with you.  Health benefits of quitting  The benefits of quitting start right away and keep improving the longer you go without smoking. These benefits occur at any age.  So whether you are 17 or 70, quitting is a good decision. Some of the benefits include:    20 minutes: Blood pressure and pulse return to normal.    8 hours: Oxygen levels return to normal.    2 days: Ability to smell and taste begin to improve as damaged nerves regrow.    2 to 3 weeks: Circulation and lung function improve.    1 to 9 months: Coughing, congestion, and  "shortness of breath decrease; tiredness decreases.    1 year: Risk of heart attack decreases by half.    5 years: Risk of lung cancer decreases by half; risk of stroke becomes the same as a nonsmoker s.  For more on how to quit smoking, try these online resources:     Smokefree.gov    \"Clearing the Air\" booklet from the National Cancer Sherman: smokefree.gov/sites/default/files/pdf/clearing-the-air-accessible.pdf  Date Last Reviewed: 4/28/2015 2000-2017 [employer]. [address] [place] [address]. All rights reserved. This information is not intended as a substitute for professional medical care. Always follow your healthcare professional's instructions.        "

## 2017-07-06 ENCOUNTER — HOSPITAL ENCOUNTER (OUTPATIENT)
Dept: MRI IMAGING | Facility: CLINIC | Age: 37
Discharge: HOME | End: 2017-07-06
Attending: FAMILY MEDICINE | Admitting: FAMILY MEDICINE
Payer: COMMERCIAL

## 2017-07-06 DIAGNOSIS — M54.5 ACUTE BILATERAL LOW BACK PAIN, WITH SCIATICA PRESENCE UNSPECIFIED: ICD-10-CM

## 2017-07-06 PROCEDURE — 72148 MRI LUMBAR SPINE W/O DYE: CPT

## 2017-07-07 ENCOUNTER — TELEPHONE (OUTPATIENT)
Dept: FAMILY MEDICINE | Facility: CLINIC | Age: 37
End: 2017-07-07

## 2017-07-07 DIAGNOSIS — M54.42 BILATERAL LOW BACK PAIN WITH LEFT-SIDED SCIATICA, UNSPECIFIED CHRONICITY: Primary | ICD-10-CM

## 2017-07-07 NOTE — LETTER
[facility]  [address] [facility] [address]  Phone: [phone]  Fax: [phone]    July 10, 2017    Christopher Mejia  [facility] [facility] [address]              To whom it may concern,        Christopher Mejia was seen on July 5, 2017. Kindly excuse his absence  07/02/17 through and 07/11/2017. He may return to work on 07/12/2017 if feels better clinically.         Sincerely,      Von Cruz MD/ brandi

## 2017-07-07 NOTE — TELEPHONE ENCOUNTER
Pt informed of  07-06-17 MRI results-         Notes Recorded by Von Cruz MD on 7/7/2017 at 11:52 AM  MRI lumbar spine showed degenerative disc disease at L4-L5 and L5-S1 with no direct impingement on neural structures. Will suggest physical therapy, ordered placed. Let us know if there are any questions.    States will schedule PT consult.   Is scheduled to return to work 07-12-17.  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  Denies duties of heavy lifting, excessive bending/ stooping, pushing/ pulling.   Forwarded to Dr. Cruz for return to work recommendations.  JANNIE Duggan RN

## 2017-07-09 NOTE — TELEPHONE ENCOUNTER
Should be able to return to work on 07/12/2017 if feels better clinically.       Von Cruz MD  [facility]

## 2017-07-10 ENCOUNTER — HOSPITAL ENCOUNTER (OUTPATIENT)
Dept: PHYSICAL THERAPY | Facility: CLINIC | Age: 37
Setting detail: THERAPIES SERIES
End: 2017-07-10
Attending: FAMILY MEDICINE
Payer: COMMERCIAL

## 2017-07-10 PROCEDURE — 40000718 ZZHC STATISTIC PT DEPARTMENT ORTHO VISIT: Performed by: PHYSICAL THERAPIST

## 2017-07-10 PROCEDURE — 97161 PT EVAL LOW COMPLEX 20 MIN: CPT | Mod: GP | Performed by: PHYSICAL THERAPIST

## 2017-07-10 PROCEDURE — 97140 MANUAL THERAPY 1/> REGIONS: CPT | Mod: GP | Performed by: PHYSICAL THERAPIST

## 2017-07-10 PROCEDURE — 97110 THERAPEUTIC EXERCISES: CPT | Mod: GP | Performed by: PHYSICAL THERAPIST

## 2017-07-10 NOTE — PROGRESS NOTES
07/10/17 0900   General Information   Type of Visit Initial OP Ortho PT Evaluation   Start of Care Date 07/10/17   Referring Physician Anthony   Patient/Family Goals Statement Pt states when the pain is gone when he is sitting or walking. Pt states he can perform his activities but uncomfortable.   Orders Evaluate and Treat   Date of Order 06/21/17   Insurance Type [employer]   Insurance Comments/Visits Authorized no limits   Medical Diagnosis B LBP with L sided sciatica   Surgical/Medical history reviewed Yes   Precautions/Limitations no known precautions/limitations   Body Part(s)   Body Part(s) Lumbar Spine/SI   Presentation and Etiology   Pertinent history of current problem (include personal factors and/or comorbidities that impact the POC) Pt states he returns to work 7/12/2107. Pt states his back pain is worse today, did some shopping and a lot of walking around. pt states walking around and sitting is not the best right now. pt states he wakes up wore from sleeping in the bed. Pt states he works for [employer]. 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 pt states he tripped on a door jam. pt states 6-7/10. no pain meds today.    Impairments A. Pain;H. Impaired gait   Functional Limitations perform activities of daily living;perform required work activities;perform desired leisure / sports activities   Symptom Location middle low back.   How/Where did it occur With a fall   Onset date of current episode/exacerbation 07/01/17   Chronicity Recurrent   Pain rating (0-10 point scale) Best (/10);Worst (/10)   Best (/10) 3   Worst (/10) 7   Pain quality C. Aching;G. Cramping   Frequency of pain/symptoms A. Constant   Pain/symptoms exacerbated by B. Walking;C. Lifting;D. Carrying;I. Bending   Pain/symptoms eased by A. Sitting;C. Rest   Current / Previous Interventions   Diagnostic Tests: MRI   MRI Results Results   MRI results DDD, L4-L5, see results for full list  of report   Current Level of Function   Patient role/employment history A. Employed   Employment Comments    Fall Risk Screen   Fall screen completed by PT   Per patient - Fall 2 or more times in past year? Yes   Per patient - Fall with injury in past year? Yes   Is patient a fall risk? No   Fall screen comments Both situation in which the patient fell, walking on snow. pt states the other time he was carrying clothes and caught his foot on the door jam.    Lumbar Spine/SI Objective Findings   Posture forward head posture, flat lumbar spine.   Gait/Locomotion Pt ambulates with decreased foot clearance and lateral leaning towards the L during gait.   Balance/Proprioception (Single Leg Stance) Pt states more wobbly on LLE. x8s B tolerated   Flexion ROM uncomfortable.    Right Side Bending ROM to knees. pt states the more he performed increased pain x5 reps   Left Side Bending ROM to knees, increased pain   Repeated Extension-Standing ROM increased pain, not great.   Pelvic Screen + Gillet's, + anterior innominate on the RLE.   Hip Screen - Hip ROM   Hip Flexion (L2) Strength 4+/5 on LLE. 5/5 RLE   Hip Extension Strength 4/5 LLE   Knee Flexion Strength 5/5 B   Knee Extension (L3) Strength 4+/5 LLE. 5/5 RLE   Hip Flexor Flexibility + tightness as indicated by prone laying   SLR + Increased pain on L   Segmental Mobility + tightness in the L5-S1   Sensation Testing Not indicated, pt states no numbness   Palpation Increased tightness in the L5-S1 with distraction. Increased pain in the Lumbar spine and muscle guarding along the erector spinae.    Lumbar/Hip/Knee/Foot Strength Comments + weakness in the L multifidus as indicated by hip extension on the L rotation and compensation using the R shoulder.   Planned Therapy Interventions   Planned Therapy Interventions IADL retraining;joint mobilization;manual therapy;motor coordination training;neuromuscular re-education;ROM;strengthening;stretching   Planned  Modality Interventions   Planned Modality Interventions TENS;Traction;Ultrasound   Clinical Impression   Criteria for Skilled Therapeutic Interventions Met yes, treatment indicated   PT Diagnosis Decreased Strength and ROM secondary to degenerative disease of the Lumbar spine   Influenced by the following impairments decreased ROM, weakness   Functional limitations due to impairments decreased participation with sitting, standing and walking   Clinical Presentation Stable/Uncomplicated   Clinical Presentation Rationale Pt presents with stable condition of LBP with few comoribidites and is motivated to partiicpate with PT   Clinical Decision Making (Complexity) Low complexity   Therapy Frequency 2 times/Week   Predicted Duration of Therapy Intervention (days/wks) 8 weeks   Risk & Benefits of therapy have been explained Yes   Patient, Family & other staff in agreement with plan of care Yes   Education Assessment   Preferred Learning Style Pictures/video   Barriers to Learning No barriers   ORTHO GOALS   PT Ortho Eval Goals 1;2;3   Ortho Goal 1   Goal Identifier HEP   Goal Description Pt will demonstrate proper form and duration of HEP in order to encourage independence and self management of symptoms   Target Date 08/21/17   Ortho Goal 2   Goal Identifier Work   Goal Description Pt will be able to sit for >1 hour at a time in 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   Target Date 09/04/17   Ortho Goal 3   Goal Identifier Ambulation   Goal Description Pt will be able to walk >1/2 mile without symptoms decreasing participation with the activity including proper form and gait pattern.   Target Date 08/21/17   Total Evaluation Time   Total Evaluation Time 25

## 2017-07-12 ENCOUNTER — HOSPITAL ENCOUNTER (OUTPATIENT)
Dept: PHYSICAL THERAPY | Facility: CLINIC | Age: 37
Setting detail: THERAPIES SERIES
End: 2017-07-12
Attending: FAMILY MEDICINE
Payer: COMMERCIAL

## 2017-07-12 ENCOUNTER — TELEPHONE (OUTPATIENT)
Dept: FAMILY MEDICINE | Facility: CLINIC | Age: 37
End: 2017-07-12

## 2017-07-12 PROCEDURE — 97140 MANUAL THERAPY 1/> REGIONS: CPT | Mod: GP | Performed by: PHYSICAL THERAPIST

## 2017-07-12 PROCEDURE — 97012 MECHANICAL TRACTION THERAPY: CPT | Mod: GP | Performed by: PHYSICAL THERAPIST

## 2017-07-12 PROCEDURE — 40000718 ZZHC STATISTIC PT DEPARTMENT ORTHO VISIT: Performed by: PHYSICAL THERAPIST

## 2017-07-12 PROCEDURE — 97110 THERAPEUTIC EXERCISES: CPT | Mod: GP | Performed by: PHYSICAL THERAPIST

## 2017-07-12 NOTE — TELEPHONE ENCOUNTER
Reason for Call:  Form, our goal is to have forms completed with 72 hours, however, some forms may require a visit or additional information.    Type of letter, form or note:  [facility]    Who is the form from?: Patient    Where did the form come from: Patient or family brought in       What clinic location was the form placed at?: [place]    Where the form was placed: 's Box    What number is listed as a contact on the form?: There is a fax number to fax form back as well as an envelope to mail it back.       Additional comments: Patient dropped off some [facility] paperwork. Given to Dr. Cruz to complete.    Call taken on 7/12/2017 at 10:29 AM by Sarina Lucio

## 2017-07-12 NOTE — TELEPHONE ENCOUNTER
Paperwork completed and given to patient's wife Raisa as he still needs to complete some of the paperwork.     Sarina Lucio-Station

## 2017-07-17 ENCOUNTER — HOSPITAL ENCOUNTER (OUTPATIENT)
Dept: PHYSICAL THERAPY | Facility: CLINIC | Age: 37
Setting detail: THERAPIES SERIES
End: 2017-07-17
Attending: FAMILY MEDICINE
Payer: COMMERCIAL

## 2017-07-17 PROCEDURE — 40000718 ZZHC STATISTIC PT DEPARTMENT ORTHO VISIT: Performed by: PHYSICAL THERAPIST

## 2017-07-17 PROCEDURE — 97110 THERAPEUTIC EXERCISES: CPT | Mod: GP | Performed by: PHYSICAL THERAPIST

## 2017-07-19 ENCOUNTER — HOSPITAL ENCOUNTER (OUTPATIENT)
Dept: PHYSICAL THERAPY | Facility: CLINIC | Age: 37
Setting detail: THERAPIES SERIES
End: 2017-07-19
Attending: FAMILY MEDICINE
Payer: COMMERCIAL

## 2017-07-19 PROCEDURE — 97110 THERAPEUTIC EXERCISES: CPT | Mod: GP | Performed by: PHYSICAL THERAPIST

## 2017-07-19 PROCEDURE — 40000718 ZZHC STATISTIC PT DEPARTMENT ORTHO VISIT: Performed by: PHYSICAL THERAPIST

## 2017-07-24 ENCOUNTER — HOSPITAL ENCOUNTER (OUTPATIENT)
Dept: PHYSICAL THERAPY | Facility: CLINIC | Age: 37
Setting detail: THERAPIES SERIES
End: 2017-07-24
Attending: FAMILY MEDICINE
Payer: COMMERCIAL

## 2017-07-24 PROCEDURE — 97110 THERAPEUTIC EXERCISES: CPT | Mod: GP | Performed by: PHYSICAL THERAPIST

## 2017-07-24 PROCEDURE — 40000718 ZZHC STATISTIC PT DEPARTMENT ORTHO VISIT: Performed by: PHYSICAL THERAPIST

## 2017-08-08 ENCOUNTER — OFFICE VISIT (OUTPATIENT)
Dept: FAMILY MEDICINE | Facility: CLINIC | Age: 37
End: 2017-08-08
Payer: COMMERCIAL

## 2017-08-08 ENCOUNTER — HOSPITAL ENCOUNTER (OUTPATIENT)
Dept: PHYSICAL THERAPY | Facility: CLINIC | Age: 37
Setting detail: THERAPIES SERIES
End: 2017-08-08
Attending: FAMILY MEDICINE
Payer: COMMERCIAL

## 2017-08-08 VITALS
SYSTOLIC BLOOD PRESSURE: 139 MMHG | RESPIRATION RATE: 18 BRPM | HEART RATE: 74 BPM | DIASTOLIC BLOOD PRESSURE: 86 MMHG | WEIGHT: 315 LBS | HEIGHT: 78 IN | BODY MASS INDEX: 36.45 KG/M2 | TEMPERATURE: 98.9 F

## 2017-08-08 DIAGNOSIS — Z72.0 TOBACCO ABUSE: ICD-10-CM

## 2017-08-08 DIAGNOSIS — E66.01 MORBID OBESITY DUE TO EXCESS CALORIES (H): ICD-10-CM

## 2017-08-08 DIAGNOSIS — K21.9 GASTROESOPHAGEAL REFLUX DISEASE WITHOUT ESOPHAGITIS: ICD-10-CM

## 2017-08-08 DIAGNOSIS — Z00.00 ROUTINE GENERAL MEDICAL EXAMINATION AT A HEALTH CARE FACILITY: Primary | ICD-10-CM

## 2017-08-08 DIAGNOSIS — G47.33 OSA ON CPAP: ICD-10-CM

## 2017-08-08 PROCEDURE — 99213 OFFICE O/P EST LOW 20 MIN: CPT | Mod: 25 | Performed by: FAMILY MEDICINE

## 2017-08-08 PROCEDURE — 40000718 ZZHC STATISTIC PT DEPARTMENT ORTHO VISIT: Performed by: PHYSICAL THERAPIST

## 2017-08-08 PROCEDURE — 97110 THERAPEUTIC EXERCISES: CPT | Mod: GP | Performed by: PHYSICAL THERAPIST

## 2017-08-08 PROCEDURE — 99395 PREV VISIT EST AGE 18-39: CPT | Performed by: FAMILY MEDICINE

## 2017-08-08 RX ORDER — PANTOPRAZOLE SODIUM 20 MG/1
TABLET, DELAYED RELEASE ORAL
Qty: 90 TABLET | Refills: 1 | Status: SHIPPED | OUTPATIENT
Start: 2017-08-08 | End: 2018-04-26

## 2017-08-08 NOTE — MR AVS SNAPSHOT
After Visit Summary   8/8/2017    Christopher Mejia    MRN: [id number]           Patient Information     Date Of Birth          1980        Visit Information        Provider Department      8/8/2017 1:40 PM Von Cruz MD [facility]        Today's Diagnoses     Routine general medical examination at a health care facility    -  1    Morbid obesity due to excess calories (H)        Tobacco abuse        Gastroesophageal reflux disease without esophagitis        ELO on CPAP          Care Instructions      Preventive Health Recommendations  Male Ages 26 - 39    Yearly exam:             See your health care provider every year in order to  o   Review health changes.   o   Discuss preventive care.    o   Review your medicines if your doctor has prescribed any.    You should be tested each year for STDs (sexually transmitted diseases), if you re at risk.     After age 35, talk to your provider about cholesterol testing. If you are at risk for heart disease, have your cholesterol tested at least every 5 years.     If you are at risk for diabetes, you should have a diabetes test (fasting glucose).  Shots: Get a flu shot each year. Get a tetanus shot every 10 years.     Nutrition:    Eat at least 5 servings of fruits and vegetables daily.     Eat whole-grain bread, whole-wheat pasta and brown rice instead of white grains and rice.     Talk to your provider about Calcium and Vitamin D.     Lifestyle    Exercise for at least 150 minutes a week (30 minutes a day, 5 days a week). This will help you control your weight and prevent disease.     Limit alcohol to one drink per day.     No smoking.     Wear sunscreen to prevent skin cancer.     See your dentist every six months for an exam and cleaning.     Patient Education    Varenicline tartrate Oral tablet    Varenicline tartrate Oral tablet, Varenicline tartrate Oral tablet  Varenicline tartrate Oral tablet  What is this  medicine?  VARENICLINE (garcia EN i kleen) is used to help people quit smoking. It can reduce the symptoms caused by stopping smoking. It is used with a patient support program recommended by your physician.  This medicine may be used for other purposes; ask your health care provider or pharmacist if you have questions.  What should I tell my health care provider before I take this medicine?  They need to know if you have any of these conditions:    bipolar disorder, depression, schizophrenia or other mental illness    heart disease    if you often drink alcohol    kidney disease    peripheral vascular disease    seizures    stroke    suicidal thoughts, plans, or attempt; a previous suicide attempt by you or a family member    an unusual or allergic reaction to varenicline, other medicines, foods, dyes, or preservatives    pregnant or trying to get pregnant    breast-feeding  How should I use this medicine?  You should set a date to stop smoking and tell your doctor. Start this medicine one week before the quit date. You can also start taking this medicine before you choose a quit date, and then pick a quit date that is between 8 and 35 days of treatment with this medicine. Stick to your plan; ask about support groups or other ways to help you remain a 'quitter'.  Take this medicine by mouth after eating. Take with a full glass of water. Follow the directions on the prescription label. Take your doses at regular intervals. Do not take your medicine more often than directed.  A special MedGuide will be given to you by the pharmacist with each prescription and refill. Be sure to read this information carefully each time.  Talk to your pediatrician regarding the use of this medicine in children. This medicine is not approved for use in children.  Overdosage: If you think you have taken too much of this medicine contact a poison control center or emergency room at once.  NOTE: This medicine is only for you. Do not share  this medicine with others.  What if I miss a dose?  If you miss a dose, take it as soon as you can. If it is almost time for your next dose, take only that dose. Do not take double or extra doses.  What may interact with this medicine?    alcohol or any product that contains alcohol    insulin    other stop smoking aids    theophylline    warfarin  This list may not describe all possible interactions. Give your health care provider a list of all the medicines, herbs, non-prescription drugs, or dietary supplements you use. Also tell them if you smoke, drink alcohol, or use illegal drugs. Some items may interact with your medicine.  What should I watch for while using this medicine?  Visit your doctor or health care professional for regular check ups. Ask for ongoing advice and encouragement from your doctor or healthcare professional, friends, and family to help you quit. If you smoke while on this medication, quit again  Your mouth may get dry. Chewing sugarless gum or sucking hard candy, and drinking plenty of water may help. Contact your doctor if the problem does not go away or is severe.  You may get drowsy or dizzy. Do not drive, use machinery, or do anything that needs mental alertness until you know how this medicine affects you. Do not stand or sit up quickly, especially if you are an older patient. This reduces the risk of dizzy or fainting spells.  The use of this medicine may increase the chance of suicidal thoughts or actions. Pay special attention to how you are responding while on this medicine. Any worsening of mood, or thoughts of suicide or dying should be reported to your health care professional right away.  What side effects may I notice from receiving this medicine?  Side effects that you should report to your doctor or health care professional as soon as possible:    allergic reactions like skin rash, itching or hives, swelling of the face, lips, tongue, or throat    breathing  problems    changes in vision    chest pain or chest tightness    confusion, trouble speaking or understanding    fast, irregular heartbeat    feeling faint or lightheaded, falls    fever    pain in legs when walking    problems with balance, talking, walking    redness, blistering, peeling or loosening of the skin, including inside the mouth    ringing in ears    seizures    sudden numbness or weakness of the face, arm or leg    suicidal thoughts or other mood changes    trouble passing urine or change in the amount of urine    unusual bleeding or bruising    unusually weak or tired  Side effects that usually do not require medical attention (report to your doctor or health care professional if they continue or are bothersome):    constipation    headache    nausea, vomiting    strange dreams    stomach gas    trouble sleeping  This list may not describe all possible side effects. Call your doctor for medical advice about side effects. You may report side effects to FDA at 5-571-KKY-7499.  Where should I keep my medicine?  Keep out of the reach of children.  Store at room temperature between 15 and 30 degrees C (59 and 86 degrees F). Throw away any unused medicine after the expiration date.  NOTE:This sheet is a summary. It may not cover all possible information. If you have questions about this medicine, talk to your doctor, pharmacist, or health care provider. Copyright  2016 [employer]                Follow-ups after your visit        Your next 10 appointments already scheduled     Aug 14, 2017  9:45 AM CDT   [facility]   [facility]    [address] [place] [id number]   [phone]              Who to contact     If you have questions or need follow up information about today's clinic visit or your schedule please contact [facility] directly at [phone].  Normal or non-critical lab and imaging  "results will be communicated to you by MyChart, letter or phone within 4 business days after the clinic has received the results. If you do not hear from us within 7 days, please contact the clinic through [employer] or phone. If you have a critical or abnormal lab result, we will notify you by phone as soon as possible.  Submit refill requests through [employer] or call your pharmacy and they will forward the refill request to us. Please allow 3 business days for your refill to be completed.          Additional Information About Your Visit        [employer] Information     [employer] lets you send messages to your doctor, view your test results, renew your prescriptions, schedule appointments and more. To sign up, go to [email or web address] . Click on \"Log in\" on the left side of the screen, which will take you to the Welcome page. Then click on \"Sign up Now\" on the right side of the page.     You will be asked to enter the access code listed below, as well as some personal information. Please follow the directions to create your username and password.     Your access code is: [email or web address]  Expires: 10/3/2017 10:08 AM     Your access code will  in 90 days. If you need help or a new code, please call your [facility] clinic or [phone].        Care EveryWhere ID     This is your Care EveryWhere ID. This could be used by other organizations to access your [facility] medical records  [id number]        Your Vitals Were     Pulse Temperature Respirations Height BMI (Body Mass Index)       74 98.9  F (37.2  C) (Tympanic) 18 6' 9\" (2.057 m) 42.44 kg/m2        Blood Pressure from Last 3 Encounters:   17 140/86   17 136/90   16 130/84    Weight from Last 3 Encounters:   17 (!) 396 lb (179.6 kg)   17 (!) 392 lb (177.8 kg)   16 (!) 384 lb (174.2 kg)              Today, you had the following     No orders found for display         Today's Medication Changes          These changes are accurate " as of: 8/8/17  2:03 PM.  If you have any questions, ask your nurse or doctor.               Start taking these medicines.        Dose/Directions    pantoprazole 20 MG EC tablet   Commonly known as:  PROTONIX   Used for:  Gastroesophageal reflux disease without esophagitis   Started by:  Von Cruz MD        Take by mouth 30-60 minutes before a meal.   Quantity:  90 tablet   Refills:  1       varenicline 0.5 MG X 11 & 1 MG X 42 tablet   Commonly known as:  CHANTIX STARTING MONTH PAK   Used for:  Tobacco abuse   Started by:  Von Cruz MD        Take 0.5 mg tab daily for 3 days, then 0.5 mg tab twice daily for 4 days, then 1 mg twice daily.   Quantity:  53 tablet   Refills:  0         These medicines have changed or have updated prescriptions.        Dose/Directions    naproxen 500 MG tablet   Commonly known as:  NAPROSYN   This may have changed:  Another medication with the same name was removed. Continue taking this medication, and follow the directions you see here.   Used for:  Acute bilateral low back pain, with sciatica presence unspecified   Changed by:  Von Cruz MD        Dose:  500 mg   Take 1 tablet (500 mg) by mouth 3 times daily (with meals)   Quantity:  90 tablet   Refills:  1         Stop taking these medicines if you haven't already. Please contact your care team if you have questions.     cyclobenzaprine 10 MG tablet   Commonly known as:  FLEXERIL   Stopped by:  Von Cruz MD                Where to get your medicines      These medications were sent to [id number] - [place], [place] - [address] [address]     Phone:  [phone]     pantoprazole 20 MG EC tablet    varenicline 0.5 MG X 11 & 1 MG X 42 tablet                Primary Care Provider Office Phone # Fax #    Gonzales Villalpando [phone]       [facility] [address] [facility] [address]        Equal Access to Services      CARLOS HUBBARD : Hadii aad ku arturo Sosally, waaxda luqadaha, qaybta kaalmada adeegbrigida, salvador bandar marinoxiomara mathur vernellkamlesh gonzalez . So [facility] [phone].    ATENCIÓN: Si habla español, tiene a matos disposición servicios gratuitos de asistencia lingüística. Llame al [phone].    We comply with applicable federal civil rights laws and [place] laws. We do not discriminate on the basis of race, color, national origin, age, disability sex, sexual orientation or gender identity.            Thank you!     Thank you for choosing [facility]  for your care. Our goal is always to provide you with excellent care. Hearing back from our patients is one way we can continue to improve our services. Please take a few minutes to complete the written survey that you may receive in the mail after your visit with us. Thank you!             Your Updated Medication List - Protect others around you: Learn how to safely use, store and throw away your medicines at www.disposemymeds.org.          This list is accurate as of: 8/8/17  2:03 PM.  Always use your most recent med list.                   Brand Name Dispense Instructions for use Diagnosis    IBUPROFEN 200 200 MG Tabs      1 TABLET EVERY 4 TO 6 HOURS AS NEEDED        naproxen 500 MG tablet    NAPROSYN    90 tablet    Take 1 tablet (500 mg) by mouth 3 times daily (with meals)    Acute bilateral low back pain, with sciatica presence unspecified       pantoprazole 20 MG EC tablet    PROTONIX    90 tablet    Take by mouth 30-60 minutes before a meal.    Gastroesophageal reflux disease without esophagitis       RANITIDINE HCL PO      Take by mouth daily        varenicline 0.5 MG X 11 & 1 MG X 42 tablet    CHANTIX STARTING MONTH FELICITAS    53 tablet    Take 0.5 mg tab daily for 3 days, then 0.5 mg tab twice daily for 4 days, then 1 mg twice daily.    Tobacco abuse

## 2017-08-08 NOTE — PATIENT INSTRUCTIONS
Preventive Health Recommendations  Male Ages 26 - 39    Yearly exam:             See your health care provider every year in order to  o   Review health changes.   o   Discuss preventive care.    o   Review your medicines if your doctor has prescribed any.    You should be tested each year for STDs (sexually transmitted diseases), if you re at risk.     After age 35, talk to your provider about cholesterol testing. If you are at risk for heart disease, have your cholesterol tested at least every 5 years.     If you are at risk for diabetes, you should have a diabetes test (fasting glucose).  Shots: Get a flu shot each year. Get a tetanus shot every 10 years.     Nutrition:    Eat at least 5 servings of fruits and vegetables daily.     Eat whole-grain bread, whole-wheat pasta and brown rice instead of white grains and rice.     Talk to your provider about Calcium and Vitamin D.     Lifestyle    Exercise for at least 150 minutes a week (30 minutes a day, 5 days a week). This will help you control your weight and prevent disease.     Limit alcohol to one drink per day.     No smoking.     Wear sunscreen to prevent skin cancer.     See your dentist every six months for an exam and cleaning.     Patient Education    Varenicline tartrate Oral tablet    Varenicline tartrate Oral tablet, Varenicline tartrate Oral tablet  Varenicline tartrate Oral tablet  What is this medicine?  VARENICLINE (garcia EN i kleen) is used to help people quit smoking. It can reduce the symptoms caused by stopping smoking. It is used with a patient support program recommended by your physician.  This medicine may be used for other purposes; ask your health care provider or pharmacist if you have questions.  What should I tell my health care provider before I take this medicine?  They need to know if you have any of these conditions:    bipolar disorder, depression, schizophrenia or other mental illness    heart disease    if you often drink  alcohol    kidney disease    peripheral vascular disease    seizures    stroke    suicidal thoughts, plans, or attempt; a previous suicide attempt by you or a family member    an unusual or allergic reaction to varenicline, other medicines, foods, dyes, or preservatives    pregnant or trying to get pregnant    breast-feeding  How should I use this medicine?  You should set a date to stop smoking and tell your doctor. Start this medicine one week before the quit date. You can also start taking this medicine before you choose a quit date, and then pick a quit date that is between 8 and 35 days of treatment with this medicine. Stick to your plan; ask about support groups or other ways to help you remain a 'quitter'.  Take this medicine by mouth after eating. Take with a full glass of water. Follow the directions on the prescription label. Take your doses at regular intervals. Do not take your medicine more often than directed.  A special MedGuide will be given to you by the pharmacist with each prescription and refill. Be sure to read this information carefully each time.  Talk to your pediatrician regarding the use of this medicine in children. This medicine is not approved for use in children.  Overdosage: If you think you have taken too much of this medicine contact a poison control center or emergency room at once.  NOTE: This medicine is only for you. Do not share this medicine with others.  What if I miss a dose?  If you miss a dose, take it as soon as you can. If it is almost time for your next dose, take only that dose. Do not take double or extra doses.  What may interact with this medicine?    alcohol or any product that contains alcohol    insulin    other stop smoking aids    theophylline    warfarin  This list may not describe all possible interactions. Give your health care provider a list of all the medicines, herbs, non-prescription drugs, or dietary supplements you use. Also tell them if you smoke,  drink alcohol, or use illegal drugs. Some items may interact with your medicine.  What should I watch for while using this medicine?  Visit your doctor or health care professional for regular check ups. Ask for ongoing advice and encouragement from your doctor or healthcare professional, friends, and family to help you quit. If you smoke while on this medication, quit again  Your mouth may get dry. Chewing sugarless gum or sucking hard candy, and drinking plenty of water may help. Contact your doctor if the problem does not go away or is severe.  You may get drowsy or dizzy. Do not drive, use machinery, or do anything that needs mental alertness until you know how this medicine affects you. Do not stand or sit up quickly, especially if you are an older patient. This reduces the risk of dizzy or fainting spells.  The use of this medicine may increase the chance of suicidal thoughts or actions. Pay special attention to how you are responding while on this medicine. Any worsening of mood, or thoughts of suicide or dying should be reported to your health care professional right away.  What side effects may I notice from receiving this medicine?  Side effects that you should report to your doctor or health care professional as soon as possible:    allergic reactions like skin rash, itching or hives, swelling of the face, lips, tongue, or throat    breathing problems    changes in vision    chest pain or chest tightness    confusion, trouble speaking or understanding    fast, irregular heartbeat    feeling faint or lightheaded, falls    fever    pain in legs when walking    problems with balance, talking, walking    redness, blistering, peeling or loosening of the skin, including inside the mouth    ringing in ears    seizures    sudden numbness or weakness of the face, arm or leg    suicidal thoughts or other mood changes    trouble passing urine or change in the amount of urine    unusual bleeding or  bruising    unusually weak or tired  Side effects that usually do not require medical attention (report to your doctor or health care professional if they continue or are bothersome):    constipation    headache    nausea, vomiting    strange dreams    stomach gas    trouble sleeping  This list may not describe all possible side effects. Call your doctor for medical advice about side effects. You may report side effects to FDA at 7-547-YIF-4976.  Where should I keep my medicine?  Keep out of the reach of children.  Store at room temperature between 15 and 30 degrees C (59 and 86 degrees F). Throw away any unused medicine after the expiration date.  NOTE:This sheet is a summary. It may not cover all possible information. If you have questions about this medicine, talk to your doctor, pharmacist, or health care provider. Copyright  2016 [employer] Standard

## 2017-08-08 NOTE — PROGRESS NOTES
SUBJECTIVE:   CC: Christopher Mejia is an 37 year old male who presents for preventative health visit.     Healthy Habits:    Do you get at least three servings of calcium containing foods daily (dairy, green leafy vegetables, etc.)? yes    Amount of exercise or daily activities, outside of work: WILL GET OUT AND WALK AROUND AT WORK WHEN HE GETS A BREAK, and chasing kids around at home     Problems taking medications regularly not applicable    Medication side effects: No    Have you had an eye exam in the past two years? yes    Do you see a dentist twice per year? no    Do you have sleep apnea, excessive snoring or daytime drowsiness?yes- has c-pap machine         PROBLEMS TO ADD ON...  Wants to quit smoking.    Re-do of [employer] papers for Back- wants intermittent   Zantac isn't lasting all day. Wondering if the dose could be increased.     Today's PHQ-2 Score:   PHQ-2 ( 1999 Pfizer) 8/8/2017   Q1: Little interest or pleasure in doing things 0   Q2: Feeling down, depressed or hopeless 0   PHQ-2 Score 0         Abuse: Current or Past(Physical, Sexual or Emotional)- No  Do you feel safe in your environment - Yes  Social History   Substance Use Topics     Smoking status: Current Every Day Smoker     Packs/day: 0.50     Types: Cigarettes     Smokeless tobacco: Not on file     Alcohol use Yes      Comment: occ.     The patient does not drink >3 drinks per day nor >7 drinks per week.    Last PSA: No results found for: PSA    Reviewed orders with patient. Reviewed health maintenance and updated orders accordingly - Yes  Labs reviewed in EPIC  BP Readings from Last 3 Encounters:   08/08/17 140/86   07/05/17 136/90   11/30/16 130/84    Wt Readings from Last 3 Encounters:   08/08/17 (!) 396 lb (179.6 kg)   07/05/17 (!) 392 lb (177.8 kg)   11/30/16 (!) 384 lb (174.2 kg)                  Patient Active Problem List   Diagnosis     Body mass index 40 and over, adult     CARDIOVASCULAR SCREENING; LDL GOAL LESS THAN 130     ELO  (obstructive sleep apnea)     Gastroesophageal reflux disease without esophagitis     Morbid obesity (H)     Tobacco dependence     Past Surgical History:   Procedure Laterality Date     APPENDECTOMY  1989       Social History   Substance Use Topics     Smoking status: Current Every Day Smoker     Packs/day: 0.50     Types: Cigarettes     Smokeless tobacco: Not on file     Alcohol use Yes      Comment: occ.     Family History   Problem Relation Age of Onset     CANCER Mother      thyroid     Respiratory Paternal Grandmother      Cardiovascular Paternal Grandmother      CANCER Paternal Grandfather          Current Outpatient Prescriptions   Medication Sig Dispense Refill     varenicline (CHANTIX STARTING MONTH PAK) 0.5 MG X 11 & 1 MG X 42 tablet Take 0.5 mg tab daily for 3 days, then 0.5 mg tab twice daily for 4 days, then 1 mg twice daily. 53 tablet 0     pantoprazole (PROTONIX) 20 MG EC tablet Take by mouth 30-60 minutes before a meal. 90 tablet 1     RANITIDINE HCL PO Take by mouth daily       naproxen (NAPROSYN) 500 MG tablet Take 1 tablet (500 mg) by mouth 3 times daily (with meals) (Patient not taking: Reported on 8/8/2017) 90 tablet 1     IBUPROFEN 200 200 MG OR TABS 1 TABLET EVERY 4 TO 6 HOURS AS NEEDED       Allergies   Allergen Reactions     Amoxicillin Hives     Recent Labs   Lab Test 05/13/16  03/30/10   1029   A1C   --   5.5   LDL  97  115   HDL  40  39*   TRIG  91  243*   CR   --   1.03   GFRESTIMATED   --   85   GFRESTBLACK   --   >90   POTASSIUM   --   4.7   TSH  2.359   --               Reviewed and updated as needed this visit by clinical staffTobacco  Allergies  Meds         Reviewed and updated as needed this visit by Provider          ROS:  C: NEGATIVE for fever, chills, change in weight  I: NEGATIVE for worrisome rashes, moles or lesions  E: NEGATIVE for vision changes or irritation  ENT: NEGATIVE for ear, mouth and throat problems  R: NEGATIVE for significant cough or SOB  CV: NEGATIVE for  "chest pain, palpitations or peripheral edema  GI: NEGATIVE for nausea, abdominal pain, heartburn, or change in bowel habits   male: negative for dysuria, hematuria, decreased urinary stream, erectile dysfunction, urethral discharge  M: NEGATIVE for significant arthralgias or myalgia (back pain better these days)  N: NEGATIVE for weakness, dizziness or paresthesias  P: NEGATIVE for changes in mood or affect    OBJECTIVE:   /86 (Cuff Size: Adult Large)  Pulse 74  Temp 98.9  F (37.2  C) (Tympanic)  Resp 18  Ht 6' 9\" (2.057 m)  Wt (!) 396 lb (179.6 kg)  BMI 42.44 kg/m2     EXAM:  GENERAL: obese, alert and no distress  EYES: Eyes grossly normal to inspection, PERRL and conjunctivae and sclerae normal  HENT: normal cephalic/atraumatic, nose and mouth without ulcers or lesions, oropharynx clear and oral mucous membranes moist  NECK: no adenopathy, no asymmetry, masses, or scars and thyroid normal to palpation  RESP: lungs clear to auscultation - no rales, rhonchi or wheezes  CV: regular rates and rhythm, normal S1 S2, no S3 or S4, no murmur, click or rub and peripheral pulses strong  ABDOMEN: soft, nontender, no hepatosplenomegaly, no masses and bowel sounds normal  MS: no gross musculoskeletal defects noted, no edema  SKIN: no suspicious lesions or rashes  NEURO: Normal strength and tone, mentation intact and speech normal  PSYCH: mentation appears normal, affect normal/bright  LYMPH: no cervical, supraclavicular, axillary, or inguinal adenopathy    ASSESSMENT/PLAN:       ICD-10-CM    1. Routine general medical examination at a health care facility Z00.00    2. Morbid obesity due to excess calories (H) E66.01    3. Tobacco abuse Z72.0 varenicline (CHANTIX STARTING MONTH PAK) 0.5 MG X 11 & 1 MG X 42 tablet   4. Gastroesophageal reflux disease without esophagitis K21.9 pantoprazole (PROTONIX) 20 MG EC tablet   5. ELO on CPAP G47.33     Z99.89      Pantoprazole prescribed for GERD. Suggested to limit " "caffeinated beverages.   Healthy lifestyle modifications stressed including regular exercise, balanced diet, weight loss and limiting salt/caffeine/pop intake.  Smokes about 10-15 cigarettes per day, health hazards discussed in detail and shared decision was made to start Chantix for smoking cessation  [place] paperwork completed and signed regarding chronic pain.  Follow-up in one year or earlier if needed.      COUNSELING:  Reviewed preventive health counseling, as reflected in patient instructions    BP Screening:   Last 3 BP Readings:    BP Readings from Last 3 Encounters:   08/08/17 140/86   07/05/17 136/90   11/30/16 130/84       The following was recommended to the patient:  Re-screen BP within a year and recommended lifestyle modifications       reports that he has been smoking Cigarettes.  He has been smoking about 0.50 packs per day. He does not have any smokeless tobacco history on file.  Tobacco Cessation Action Plan: Pharmacotherapies : Chantix  Estimated body mass index is 42.44 kg/(m^2) as calculated from the following:    Height as of this encounter: 6' 9\" (2.057 m).    Weight as of this encounter: 396 lb (179.6 kg).   Weight management plan: Discussed healthy diet and exercise guidelines and patient will follow up in 12 months in clinic to re-evaluate.    Counseling Resources:  ATP IV Guidelines  Pooled Cohorts Equation Calculator  FRAX Risk Assessment  ICSI Preventive Guidelines  Dietary Guidelines for Americans, 2010  USDA's MyPlate  ASA Prophylaxis  Lung CA Screening    Patient Instructions     Preventive Health Recommendations  Male Ages 26 - 39    Yearly exam:             See your health care provider every year in order to  o   Review health changes.   o   Discuss preventive care.    o   Review your medicines if your doctor has prescribed any.    You should be tested each year for STDs (sexually transmitted diseases), if you re at risk.     After age 35, talk to your provider about cholesterol " testing. If you are at risk for heart disease, have your cholesterol tested at least every 5 years.     If you are at risk for diabetes, you should have a diabetes test (fasting glucose).  Shots: Get a flu shot each year. Get a tetanus shot every 10 years.     Nutrition:    Eat at least 5 servings of fruits and vegetables daily.     Eat whole-grain bread, whole-wheat pasta and brown rice instead of white grains and rice.     Talk to your provider about Calcium and Vitamin D.     Lifestyle    Exercise for at least 150 minutes a week (30 minutes a day, 5 days a week). This will help you control your weight and prevent disease.     Limit alcohol to one drink per day.     No smoking.     Wear sunscreen to prevent skin cancer.     See your dentist every six months for an exam and cleaning.     Patient Education    Varenicline tartrate Oral tablet    Varenicline tartrate Oral tablet, Varenicline tartrate Oral tablet  Varenicline tartrate Oral tablet  What is this medicine?  VARENICLINE (garcia EN i kleen) is used to help people quit smoking. It can reduce the symptoms caused by stopping smoking. It is used with a patient support program recommended by your physician.  This medicine may be used for other purposes; ask your health care provider or pharmacist if you have questions.  What should I tell my health care provider before I take this medicine?  They need to know if you have any of these conditions:    bipolar disorder, depression, schizophrenia or other mental illness    heart disease    if you often drink alcohol    kidney disease    peripheral vascular disease    seizures    stroke    suicidal thoughts, plans, or attempt; a previous suicide attempt by you or a family member    an unusual or allergic reaction to varenicline, other medicines, foods, dyes, or preservatives    pregnant or trying to get pregnant    breast-feeding  How should I use this medicine?  You should set a date to stop smoking and tell your  doctor. Start this medicine one week before the quit date. You can also start taking this medicine before you choose a quit date, and then pick a quit date that is between 8 and 35 days of treatment with this medicine. Stick to your plan; ask about support groups or other ways to help you remain a 'quitter'.  Take this medicine by mouth after eating. Take with a full glass of water. Follow the directions on the prescription label. Take your doses at regular intervals. Do not take your medicine more often than directed.  A special MedGuide will be given to you by the pharmacist with each prescription and refill. Be sure to read this information carefully each time.  Talk to your pediatrician regarding the use of this medicine in children. This medicine is not approved for use in children.  Overdosage: If you think you have taken too much of this medicine contact a poison control center or emergency room at once.  NOTE: This medicine is only for you. Do not share this medicine with others.  What if I miss a dose?  If you miss a dose, take it as soon as you can. If it is almost time for your next dose, take only that dose. Do not take double or extra doses.  What may interact with this medicine?    alcohol or any product that contains alcohol    insulin    other stop smoking aids    theophylline    warfarin  This list may not describe all possible interactions. Give your health care provider a list of all the medicines, herbs, non-prescription drugs, or dietary supplements you use. Also tell them if you smoke, drink alcohol, or use illegal drugs. Some items may interact with your medicine.  What should I watch for while using this medicine?  Visit your doctor or health care professional for regular check ups. Ask for ongoing advice and encouragement from your doctor or healthcare professional, friends, and family to help you quit. If you smoke while on this medication, quit again  Your mouth may get dry. Chewing  sugarless gum or sucking hard candy, and drinking plenty of water may help. Contact your doctor if the problem does not go away or is severe.  You may get drowsy or dizzy. Do not drive, use machinery, or do anything that needs mental alertness until you know how this medicine affects you. Do not stand or sit up quickly, especially if you are an older patient. This reduces the risk of dizzy or fainting spells.  The use of this medicine may increase the chance of suicidal thoughts or actions. Pay special attention to how you are responding while on this medicine. Any worsening of mood, or thoughts of suicide or dying should be reported to your health care professional right away.  What side effects may I notice from receiving this medicine?  Side effects that you should report to your doctor or health care professional as soon as possible:    allergic reactions like skin rash, itching or hives, swelling of the face, lips, tongue, or throat    breathing problems    changes in vision    chest pain or chest tightness    confusion, trouble speaking or understanding    fast, irregular heartbeat    feeling faint or lightheaded, falls    fever    pain in legs when walking    problems with balance, talking, walking    redness, blistering, peeling or loosening of the skin, including inside the mouth    ringing in ears    seizures    sudden numbness or weakness of the face, arm or leg    suicidal thoughts or other mood changes    trouble passing urine or change in the amount of urine    unusual bleeding or bruising    unusually weak or tired  Side effects that usually do not require medical attention (report to your doctor or health care professional if they continue or are bothersome):    constipation    headache    nausea, vomiting    strange dreams    stomach gas    trouble sleeping  This list may not describe all possible side effects. Call your doctor for medical advice about side effects. You may report side effects to  FDA at 8-440-ADQ-1541.  Where should I keep my medicine?  Keep out of the reach of children.  Store at room temperature between 15 and 30 degrees C (59 and 86 degrees F). Throw away any unused medicine after the expiration date.  NOTE:This sheet is a summary. It may not cover all possible information. If you have questions about this medicine, talk to your doctor, pharmacist, or health care provider. Copyright  2016 [employer]              Von Cruz MD  [facility]

## 2017-08-14 ENCOUNTER — HOSPITAL ENCOUNTER (OUTPATIENT)
Dept: PHYSICAL THERAPY | Facility: CLINIC | Age: 37
Setting detail: THERAPIES SERIES
End: 2017-08-14
Attending: FAMILY MEDICINE
Payer: COMMERCIAL

## 2017-08-14 PROCEDURE — 40000718 ZZHC STATISTIC PT DEPARTMENT ORTHO VISIT: Performed by: PHYSICAL THERAPIST

## 2017-08-14 PROCEDURE — 97110 THERAPEUTIC EXERCISES: CPT | Mod: GP | Performed by: PHYSICAL THERAPIST

## 2017-08-14 NOTE — PROGRESS NOTES
Outpatient Physical Therapy Discharge Note     Patient: Christopher Mejia  : 1980    Beginning/End Dates of Reporting Period:  7/10/2017 to 2017    Referring Provider: Anthony    Therapy Diagnosis: Decreased Strength and ROM secondary to degenerative disease of the Lumbar spine     Client Self Report: Pt states his back feels 100% better than it did before. pt states PT has been a turning point for him and his wife as they wanted to get in shape, he even quick smoking.    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   Target Date 17   Date Met  17   Progress:     Goal Identifier Ambulation   Goal Description Pt will be able to walk >1/2 mile without symptoms decreasing participation with the activity including proper form and gait pattern.   Target Date 17   Date Met  17   Progress:     Goal Identifier     Goal Description     Target Date     Date Met      Progress:     Goal Identifier     Goal Description     Target Date     Date Met      Progress:     Goal Identifier     Goal Description     Target Date     Date Met      Progress:     Goal Identifier     Goal Description     Target Date     Date Met      Progress:     Goal Identifier     Goal Description     Target Date     Date Met      Progress:     Progress Toward Goals:   Progress this reporting period: Pt has achieved all goals of decreasing back pain by performing core strengthening. Pt is motivated to become independent with exercises including preventing further injury with performing a maintenance program such as stretching and strengthening the low  back.            Plan:  Discharge from therapy.    Discharge:    Reason for Discharge: Patient has met all goals.    Discharge Plan: Patient to continue home program.

## 2017-12-13 ENCOUNTER — OFFICE VISIT (OUTPATIENT)
Dept: FAMILY MEDICINE | Facility: CLINIC | Age: 37
End: 2017-12-13
Payer: COMMERCIAL

## 2017-12-13 VITALS
SYSTOLIC BLOOD PRESSURE: 160 MMHG | HEART RATE: 79 BPM | DIASTOLIC BLOOD PRESSURE: 100 MMHG | WEIGHT: 315 LBS | OXYGEN SATURATION: 98 % | TEMPERATURE: 98.5 F | BODY MASS INDEX: 43.19 KG/M2

## 2017-12-13 DIAGNOSIS — M51.369 DDD (DEGENERATIVE DISC DISEASE), LUMBAR: ICD-10-CM

## 2017-12-13 DIAGNOSIS — I10 BENIGN ESSENTIAL HYPERTENSION: Primary | ICD-10-CM

## 2017-12-13 DIAGNOSIS — Z23 NEED FOR PROPHYLACTIC VACCINATION AND INOCULATION AGAINST INFLUENZA: ICD-10-CM

## 2017-12-13 DIAGNOSIS — F17.200 TOBACCO DEPENDENCE: ICD-10-CM

## 2017-12-13 DIAGNOSIS — E66.01 MORBID OBESITY (H): ICD-10-CM

## 2017-12-13 PROCEDURE — 99214 OFFICE O/P EST MOD 30 MIN: CPT | Performed by: FAMILY MEDICINE

## 2017-12-13 PROCEDURE — 90471 IMMUNIZATION ADMIN: CPT | Performed by: FAMILY MEDICINE

## 2017-12-13 PROCEDURE — 90686 IIV4 VACC NO PRSV 0.5 ML IM: CPT | Performed by: FAMILY MEDICINE

## 2017-12-13 RX ORDER — HYDROCHLOROTHIAZIDE 25 MG/1
25 TABLET ORAL DAILY
Qty: 90 TABLET | Refills: 1 | Status: SHIPPED | OUTPATIENT
Start: 2017-12-13 | End: 2018-12-26

## 2017-12-13 NOTE — MR AVS SNAPSHOT
After Visit Summary   12/13/2017    Christopher Mejia    MRN: [id number]           Patient Information     Date Of Birth          1980        Visit Information        Provider Department      12/13/2017 11:20 AM Von Cruz MD [facility]        Today's Diagnoses     Benign essential hypertension    -  1    DDD (degenerative disc disease), lumbar        Morbid obesity (H)          Care Instructions      Aerobic Exercise for a Healthy Heart  Exercise is a lot more than an energy booster and a stress reliever. It also strengthens your heart muscle, lowers your blood pressure and cholesterol, and burns calories. It can also improve your resting muscle tone, and your mood.     Remember, some activity is better than none.    Choose an aerobic activity  Choose an activity that makes your heart and lungs work harder than they do when you rest or walk normally. This aerobic exercise can improve the way your heart and other muscles use oxygen. Make it fun by exercising with a friend and choosing an activity you enjoy. Here are some ideas:    Walking    Swimming    Bicycling    Stair climbing    Dancing    Jogging    Gardening  Exercise regularly  If you haven t been exercising regularly,  get your doctor s OK first. Then start slowly.  Here are some tips:    Begin exercising 3 times a week for 5 to 10 minutes at a time.    When you feel comfortable, add a few minutes each session.    Slowly build up to exercising 3 to 4 times each week. Each session should last for 40 minutes, on average, and involve moderate- to vigorous-intensity physical activity.    If you have been given nitroglycerin, be sure to carry it when you exercise.    If you get chest pain (angina) when you re exercising, stop what you re doing, take your nitroglycerin, and call your doctor.  Date Last Reviewed: 6/2/2016 2000-2017 [employer]. [address] [place] [address], [place] [address]. All rights  reserved. This information is not intended as a substitute for professional medical care. Always follow your healthcare professional's instructions.        High Blood Pressure, New, Begin Treatment  Your blood pressure was high enough today to start treatment with medicines. Often health care providers don t know what causes high blood pressure (hypertension). But it can be controlled with lifestyle changes and medicines. High blood pressure usually has no symptoms. But it can sometimes cause headache, dizziness, blurred vision, a rushing sound in your ears, chest pain, or shortness of breath. But even without symptoms, high blood pressure that s not treated raises your risk for heart attack and stroke. High blood pressure is a serious health risk and shouldn t be ignored.    A blood pressure reading is made up of two numbers: a higher number over a lower number. The top number is the systolic pressure. The bottom number is the diastolic pressure. A normal blood pressure is a systolic pressure of less than 120 over a diastolic pressure less than 80. You will see your blood pressure readings written together. For example, a person with a systolic pressure of 118 and a diastolic pressure of 78 will have 118/78 written in the medical record.  High blood pressure is when either the top number is 140 or higher, or the bottom number is 90 or higher. High blood pressure is diagnosed when multiple, separate readings show blood pressures above 140/90. The blood pressures between normal and high are called prehypertension.  Home care  If you have high blood pressure, you should do what is listed below to lower your blood pressure. If you are taking medicines for high blood pressure, these methods may reduce or end your need for medicines in the future.    Begin a weight-loss program if you are overweight.    Cut back on how much salt you get in your diet. Here s how to do this:    Don t eat foods that have a lot of salt.  These include olives, pickles, smoked meats, and salted potato chips.    Don t add salt to your food at the table.    Use only small amounts of salt when cooking.    Review food labels to track how much salt is in prepared foods.    When eating out, ask that no additional salt be added to your food order.    Begin an exercise program. Talk with your health care provider about the type of exercise program that would be best for you. It doesn't have to be hard. Even brisk walking for 20 minutes 3 times a week is a good form of exercise.    Don t take medicines that have heart stimulants. This includes many over-the-counter cold and sinus decongestant pills and sprays, as well as diet pills. Check the warnings about hypertension on the label. Before purchasing any over-the-counter medicines or supplements, always ask the pharmacist about the product's potential interaction with your high blood pressure and your high blood pressure medicines.    Stimulants such as amphetamine or cocaine could be lethal for someone with high blood pressure. Never take these.    Limit how much caffeine you get in your diet. Switch to caffeine-free products.    Stop smoking. If you are a long-time smoker, this can be hard. Enroll in a stop-smoking program to make it more likely that you will quit for good.    Learn how to handle stress. This is an important part of any program to lower blood pressure. Learn about relaxation methods like meditation, yoga, or biofeedback.    If your provider prescribed medicines, take them exactly as directed. Missing doses may cause your blood pressure get out of control.    If you miss a dose or doses, check with your healthcare provider or pharmacist about what to do.    Consider buying an automatic blood pressure machine. Your provider can make a recommendation. You can get one of these at most pharmacies.  The American Heart Association recommends the following guidelines for home blood pressure  monitoring:    Don't smoke or drink coffee for 30 minutes before taking your blood pressure.    Go to the bathroom before the test.    Relax for 5 minutes before taking the measurement.    Sit with your back supported (don't sit on a couch or soft chair); keep your feet on the floor uncrossed. Place your arm on a solid flat surface (like a table) with the upper part of the arm at heart level. Place the middle of the cuff directly above the eye of the elbow. Check the monitor's instruction manual for an illustration.    Take multiple readings. When you measure, take 2 to 3 readings one minute apart and record all of the results.    Take your blood pressure at the same time every day, or as your healthcare provider recommends.    Record the date, time, and blood pressure reading.    Take the record with you to your next medical appointment. If your blood pressure monitor has a built-in memory, simply take the monitor with you to your next appointment.    Call your provider if you have several high readings. Don't be frightened by a single high blood pressure reading, but if you get several high readings, check in with your healthcare provider.    Note: When blood pressure reaches a systolic (top number) of 180 or higher OR diastolic (bottom number) of 110 or higher, seek emergency medical treatment.  Follow-up care  Because a new blood pressure medicine was started today, it s important that you have your blood pressure rechecked. This is to make sure that the medicine is working and that you have no serious side effects. Keep all your follow up appointments. Write down medicine and blood pressure questions and bring them to your next appointment. If you have pressing concerns about your new medicine or your blood pressure, call your provider. Unless told otherwise, follow up with your health care provider or this facility within the next 3 days.  When to seek medical advice  Call your healthcare provider right away  if any of these occur:    Blood pressure reaches a systolic (top number) of 180 or higher, OR diastolic (bottom number) of 110 or higher, seek emergency medical treatment.    Chest pain or shortness of breath    Severe headache    Throbbing or rushing sound in the ears    Nosebleed    Sudden severe pain in your belly (abdomen)    Extreme drowsiness, confusion, or fainting    Dizziness or dizziness with a spinning sensation (vertigo)    Weakness of an arm or leg or one side of the face    You have problems speaking or seeing   Date Last Reviewed: 12/1/2016 2000-2017 [employer]. [address] [place] [address]. All rights reserved. This information is not intended as a substitute for professional medical care. Always follow your healthcare professional's instructions.        Patient Education    Hydrochlorothiazide Oral capsule    Hydrochlorothiazide Oral tablet  Hydrochlorothiazide Oral tablet  What is this medicine?  HYDROCHLOROTHIAZIDE (tai droe klor oh THYE a zide) is a diuretic. It increases the amount of urine passed, which causes the body to lose salt and water. This medicine is used to treat high blood pressure. It is also reduces the swelling and water retention caused by various medical conditions, such as heart, liver, or kidney disease.  This medicine may be used for other purposes; ask your health care provider or pharmacist if you have questions.  What should I tell my health care provider before I take this medicine?  They need to know if you have any of these conditions:    diabetes    gout    immune system problems, like lupus    kidney disease or kidney stones    liver disease    pancreatitis    small amount of urine or difficulty passing urine    an unusual or allergic reaction to hydrochlorothiazide, sulfa drugs, other medicines, foods, dyes, or preservatives    pregnant or trying to get pregnant    breast-feeding  How should I use this medicine?  Take this medicine by  mouth with a glass of water. Follow the directions on the prescription label. Take your medicine at regular intervals. Remember that you will need to pass urine frequently after taking this medicine. Do not take your doses at a time of day that will cause you problems. Do not stop taking your medicine unless your doctor tells you to.  Talk to your pediatrician regarding the use of this medicine in children. Special care may be needed.  Overdosage: If you think you have taken too much of this medicine contact a poison control center or emergency room at once.  NOTE: This medicine is only for you. Do not share this medicine with others.  What if I miss a dose?  If you miss a dose, take it as soon as you can. If it is almost time for your next dose, take only that dose. Do not take double or extra doses.  What may interact with this medicine?    cholestyramine    colestipol    digoxin    dofetilide    lithium    medicines for blood pressure    medicines for diabetes    medicines that relax muscles for surgery    other diuretics    steroid medicines like prednisone or cortisone  This list may not describe all possible interactions. Give your health care provider a list of all the medicines, herbs, non-prescription drugs, or dietary supplements you use. Also tell them if you smoke, drink alcohol, or use illegal drugs. Some items may interact with your medicine.  What should I watch for while using this medicine?  Visit your doctor or health care professional for regular checks on your progress. Check your blood pressure as directed. Ask your doctor or health care professional what your blood pressure should be and when you should contact him or her.  You may need to be on a special diet while taking this medicine. Ask your doctor.  Check with your doctor or health care professional if you get an attack of severe diarrhea, nausea and vomiting, or if you sweat a lot. The loss of too much body fluid can make it dangerous  for you to take this medicine.  You may get drowsy or dizzy. Do not drive, use machinery, or do anything that needs mental alertness until you know how this medicine affects you. Do not stand or sit up quickly, especially if you are an older patient. This reduces the risk of dizzy or fainting spells. Alcohol may interfere with the effect of this medicine. Avoid alcoholic drinks.  This medicine may affect your blood sugar level. If you have diabetes, check with your doctor or health care professional before changing the dose of your diabetic medicine.  This medicine can make you more sensitive to the sun. Keep out of the sun. If you cannot avoid being in the sun, wear protective clothing and use sunscreen. Do not use sun lamps or tanning beds/booths.  What side effects may I notice from receiving this medicine?  Side effects that you should report to your doctor or health care professional as soon as possible:    allergic reactions such as skin rash or itching, hives, swelling of the lips, mouth, tongue, or throat    changes in vision    chest pain    eye pain    fast or irregular heartbeat    feeling faint or lightheaded, falls    gout attack    muscle pain or cramps    pain or difficulty when passing urine    pain, tingling, numbness in the hands or feet    redness, blistering, peeling or loosening of the skin, including inside the mouth    unusually weak or tired  Side effects that usually do not require medical attention (report to your doctor or health care professional if they continue or are bothersome):    change in sex drive or performance    dry mouth    headache    stomach upset  This list may not describe all possible side effects. Call your doctor for medical advice about side effects. You may report side effects to FDA at 4-780-FDA-4341.  Where should I keep my medicine?  Keep out of the reach of children.  Store at room temperature between 15 and 30 degrees C (59 and 86 degrees F). Do not freeze.  Protect from light and moisture. Keep container closed tightly. Throw away any unused medicine after the expiration date.  NOTE:This sheet is a summary. It may not cover all possible information. If you have questions about this medicine, talk to your doctor, pharmacist, or health care provider. Copyright  2016 [employer]        Back Care Tips    Caring for your back  These are things you can do to prevent a recurrence of acute back pain and to reduce symptoms from chronic back pain:    Maintain a healthy weight. If you are overweight, losing weight will help most types of back pain.    Exercise is an important part of recovery from most types of back pain. The muscles behind and in front of the spine support the back. This means strengthening both the back muscles and the abdominal muscles will provide better support for your spine.     Swimming and brisk walking are good overall exercises to improve your fitness level.    Practice safe lifting methods (below).    Practice good posture when sitting, standing and walking. Avoid prolonged sitting. This puts more stress on the lower back than standing or walking.    Wear quality shoes with sufficient arch support. Foot and ankle alignment can affect back symptoms. Women should avoid wearing high heels.    Therapeutic massage can help relax the back muscles without stretching them.    During the first 24 to 72 hours after an acute injury or flare-up of chronic back pain, apply an ice pack to the painful area for 20 minutes and then remove it for 20 minutes, over a period of 60 to 90 minutes, or several times a day. As a safety precaution, do not use a heating pad at bedtime. Sleeping on a heating pad can lead to skin burns or tissue damage.    You can alternate ice and heat therapies.  Medications  Talk to your healthcare provider before using medicines, especially if you have other medical problems or are taking other medicines.    You may use acetaminophen or  ibuprofen to control pain, unless your healthcare provider prescribed other pain medicine. If you have chronic conditions like diabetes, liver or kidney disease, stomach ulcers, or gastrointestinal bleeding, or are taking blood thinners, talk with your healthcare provider before taking any medicines.    Be careful if you are given prescription pain medicines, narcotics, or medicine for muscle spasm. They can cause drowsiness, affect your coordination, reflexes, and judgment. Do not drive or operate heavy machinery while taking these types of medicines. Take prescription pain medicine only as prescribed by your healthcare provider.  Lumbar stretch  Here is a simple stretching exercise that will help relax muscle spasm and keep your back more limber. If exercise makes your back pain worse, don t do it.    Lie on your back with your knees bent and both feet on the ground.    Slowly raise your left knee to your chest as you flatten your lower back against the floor. Hold for 5 seconds.    Relax and repeat the exercise with your right knee.    Do 10 of these exercises for each leg.  Safe lifting method    Don t bend over at the waist to lift an object off the floor.  Instead, bend your knees and hips in a squat.     Keep your back and head upright    Hold the object close to your body, directly in front of you.    Straighten your legs to lift the object.     Lower the object to the floor in the reverse fashion.    If you must slide something across the floor, push it.  Posture tips  Sitting  Sit in chairs with straight backs or low-back support. Keep your knees lower than your hips, with your feet flat on the floor.  When driving, sit up straight. Adjust the seat forward so you are not leaning toward the steering wheel.  A small pillow or rolled towel behind your lower back may help if you are driving long distances.   Standing  When standing for long periods, shift most of your weight to one leg at a time. Alternate  legs every few minutes.   Sleeping  The best way to sleep is on your side with your knees bent. Put a low pillow under your head to support your neck in a neutral spine position. Avoid thick pillows that bend your neck to one side. Put a pillow between your legs to further relax your lower back. If you sleep on your back, put pillows under your knees to support your legs in a slightly flexed position. Use a firm mattress. If your mattress sags, replace it, or use a 1/2-inch plywood board under the mattress to add support.  Follow-up care  Follow up with your healthcare provider, or as advised.  If X-rays, a CT scan or an MRI scan were taken, they will be reviewed by a radiologist. You will be notified of any new findings that may affect your care.  Call 911  Seek emergency medical care if any of the following occur:    Trouble breathing    Confusion    Very drowsy    Fainting or loss of consciousness    Rapid or very slow heart rate    Loss of  bowel or bladder control  When to seek medical care  Call your healthcare provider if any of the following occur:    Pain becomes worse or spreads to your arms or legs    Weakness or numbness in one or both arms or legs    Numbness in the groin area  Date Last Reviewed: 6/1/2016 2000-2017 [employer]. [address] [place] [address]. All rights reserved. This information is not intended as a substitute for professional medical care. Always follow your healthcare professional's instructions.                Follow-ups after your visit        Future tests that were ordered for you today     Open Future Orders        Priority Expected Expires Ordered    Basic metabolic panel Routine 12/27/2017 12/13/2018 12/13/2017            Who to contact     If you have questions or need follow up information about today's clinic visit or your schedule please contact [facility] directly at [phone].  Normal or non-critical lab and imaging results  "will be communicated to you by MyChart, letter or phone within 4 business days after the clinic has received the results. If you do not hear from us within 7 days, please contact the clinic through [employer] or phone. If you have a critical or abnormal lab result, we will notify you by phone as soon as possible.  Submit refill requests through [employer] or call your pharmacy and they will forward the refill request to us. Please allow 3 business days for your refill to be completed.          Additional Information About Your Visit        [employer] Information     [employer] lets you send messages to your doctor, view your test results, renew your prescriptions, schedule appointments and more. To sign up, go to [email or web address] . Click on \"Log in\" on the left side of the screen, which will take you to the Welcome page. Then click on \"Sign up Now\" on the right side of the page.     You will be asked to enter the access code listed below, as well as some personal information. Please follow the directions to create your username and password.     Your access code is: [email or web address]  Expires: 3/13/2018 11:56 AM     Your access code will  in 90 days. If you need help or a new code, please call your [facility] clinic or [phone].        Care EveryWhere ID     This is your Care EveryWhere ID. This could be used by other organizations to access your [facility] medical records  [id number]        Your Vitals Were     Pulse Temperature Pulse Oximetry BMI (Body Mass Index)          79 98.5  F (36.9  C) (Tympanic) 98% 43.19 kg/m2         Blood Pressure from Last 3 Encounters:   17 (!) 160/100   17 139/86   17 136/90    Weight from Last 3 Encounters:   17 (!) 403 lb (182.8 kg)   17 (!) 396 lb (179.6 kg)   17 (!) 392 lb (177.8 kg)                 Today's Medication Changes          These changes are accurate as of: 17 11:56 AM.  If you have any questions, ask your nurse or doctor.       "         Start taking these medicines.        Dose/Directions    hydrochlorothiazide 25 MG tablet   Commonly known as:  HYDRODIURIL   Used for:  Benign essential hypertension   Started by:  Von Cruz MD        Dose:  25 mg   Take 1 tablet (25 mg) by mouth daily   Quantity:  90 tablet   Refills:  1            Where to get your medicines      These medications were sent to [id number] - [place], [place] - [address]   [address] , [facility] [address]     Phone:  [phone]     hydrochlorothiazide 25 MG tablet                Primary Care Provider Office Phone # Fax #    Gonzales Villalpando [phone]       [address] [facility] [address]        Equal Access to Services     [facility] : Pietro hernandez hadasho Sosally, waaxda luqadaha, qaybta kaalmada adeegyada, salvador gonzalez . So [facility] [place] [phone].    ATENCIÓN: Si habla español, tiene a matos disposición servicios gratuitos de asistencia lingüística. Llame al [phone].    We comply with applicable federal civil rights laws and [place] laws. We do not discriminate on the basis of race, color, national origin, age, disability, sex, sexual orientation, or gender identity.            Thank you!     Thank you for choosing [facility]  for your care. Our goal is always to provide you with excellent care. Hearing back from our patients is one way we can continue to improve our services. Please take a few minutes to complete the written survey that you may receive in the mail after your visit with us. Thank you!             Your Updated Medication List - Protect others around you: Learn how to safely use, store and throw away your medicines at www.disposemymeds.org.          This list is accurate as of: 12/13/17 11:56 AM.  Always use your most recent med list.                   Brand Name Dispense Instructions for use Diagnosis    hydrochlorothiazide 25 MG tablet    HYDRODIURIL     90 tablet    Take 1 tablet (25 mg) by mouth daily    Benign essential hypertension       IBUPROFEN 200 200 MG Tabs      1 TABLET EVERY 4 TO 6 HOURS AS NEEDED        naproxen 500 MG tablet    NAPROSYN    90 tablet    Take 1 tablet (500 mg) by mouth 3 times daily (with meals)    Acute bilateral low back pain, with sciatica presence unspecified       pantoprazole 20 MG EC tablet    PROTONIX    90 tablet    Take by mouth 30-60 minutes before a meal.    Gastroesophageal reflux disease without esophagitis       RANITIDINE HCL PO      Take by mouth daily        varenicline 0.5 MG X 11 & 1 MG X 42 tablet    [facility] [employer]    53 tablet    Take 0.5 mg tab daily for 3 days, then 0.5 mg tab twice daily for 4 days, then 1 mg twice daily.    Tobacco abuse

## 2017-12-13 NOTE — PATIENT INSTRUCTIONS
Aerobic Exercise for a Healthy Heart  Exercise is a lot more than an energy booster and a stress reliever. It also strengthens your heart muscle, lowers your blood pressure and cholesterol, and burns calories. It can also improve your resting muscle tone, and your mood.     Remember, some activity is better than none.    Choose an aerobic activity  Choose an activity that makes your heart and lungs work harder than they do when you rest or walk normally. This aerobic exercise can improve the way your heart and other muscles use oxygen. Make it fun by exercising with a friend and choosing an activity you enjoy. Here are some ideas:    Walking    Swimming    Bicycling    Stair climbing    Dancing    Jogging    Gardening  Exercise regularly  If you haven t been exercising regularly,  get your doctor s OK first. Then start slowly.  Here are some tips:    Begin exercising 3 times a week for 5 to 10 minutes at a time.    When you feel comfortable, add a few minutes each session.    Slowly build up to exercising 3 to 4 times each week. Each session should last for 40 minutes, on average, and involve moderate- to vigorous-intensity physical activity.    If you have been given nitroglycerin, be sure to carry it when you exercise.    If you get chest pain (angina) when you re exercising, stop what you re doing, take your nitroglycerin, and call your doctor.  Date Last Reviewed: 6/2/2016 2000-2017 [employer]. [address] [place] [address], [place] [address]. All rights reserved. This information is not intended as a substitute for professional medical care. Always follow your healthcare professional's instructions.        High Blood Pressure, New, Begin Treatment  Your blood pressure was high enough today to start treatment with medicines. Often health care providers don t know what causes high blood pressure (hypertension). But it can be controlled with lifestyle changes and medicines. High blood pressure usually  has no symptoms. But it can sometimes cause headache, dizziness, blurred vision, a rushing sound in your ears, chest pain, or shortness of breath. But even without symptoms, high blood pressure that s not treated raises your risk for heart attack and stroke. High blood pressure is a serious health risk and shouldn t be ignored.    A blood pressure reading is made up of two numbers: a higher number over a lower number. The top number is the systolic pressure. The bottom number is the diastolic pressure. A normal blood pressure is a systolic pressure of less than 120 over a diastolic pressure less than 80. You will see your blood pressure readings written together. For example, a person with a systolic pressure of 118 and a diastolic pressure of 78 will have 118/78 written in the medical record.  High blood pressure is when either the top number is 140 or higher, or the bottom number is 90 or higher. High blood pressure is diagnosed when multiple, separate readings show blood pressures above 140/90. The blood pressures between normal and high are called prehypertension.  Home care  If you have high blood pressure, you should do what is listed below to lower your blood pressure. If you are taking medicines for high blood pressure, these methods may reduce or end your need for medicines in the future.    Begin a weight-loss program if you are overweight.    Cut back on how much salt you get in your diet. Here s how to do this:    Don t eat foods that have a lot of salt. These include olives, pickles, smoked meats, and salted potato chips.    Don t add salt to your food at the table.    Use only small amounts of salt when cooking.    Review food labels to track how much salt is in prepared foods.    When eating out, ask that no additional salt be added to your food order.    Begin an exercise program. Talk with your health care provider about the type of exercise program that would be best for you. It doesn't have to be  hard. Even brisk walking for 20 minutes 3 times a week is a good form of exercise.    Don t take medicines that have heart stimulants. This includes many over-the-counter cold and sinus decongestant pills and sprays, as well as diet pills. Check the warnings about hypertension on the label. Before purchasing any over-the-counter medicines or supplements, always ask the pharmacist about the product's potential interaction with your high blood pressure and your high blood pressure medicines.    Stimulants such as amphetamine or cocaine could be lethal for someone with high blood pressure. Never take these.    Limit how much caffeine you get in your diet. Switch to caffeine-free products.    Stop smoking. If you are a long-time smoker, this can be hard. Enroll in a stop-smoking program to make it more likely that you will quit for good.    Learn how to handle stress. This is an important part of any program to lower blood pressure. Learn about relaxation methods like meditation, yoga, or biofeedback.    If your provider prescribed medicines, take them exactly as directed. Missing doses may cause your blood pressure get out of control.    If you miss a dose or doses, check with your healthcare provider or pharmacist about what to do.    Consider buying an automatic blood pressure machine. Your provider can make a recommendation. You can get one of these at most pharmacies.  The American Heart Association recommends the following guidelines for home blood pressure monitoring:    Don't smoke or drink coffee for 30 minutes before taking your blood pressure.    Go to the bathroom before the test.    Relax for 5 minutes before taking the measurement.    Sit with your back supported (don't sit on a couch or soft chair); keep your feet on the floor uncrossed. Place your arm on a solid flat surface (like a table) with the upper part of the arm at heart level. Place the middle of the cuff directly above the eye of the elbow.  Check the monitor's instruction manual for an illustration.    Take multiple readings. When you measure, take 2 to 3 readings one minute apart and record all of the results.    Take your blood pressure at the same time every day, or as your healthcare provider recommends.    Record the date, time, and blood pressure reading.    Take the record with you to your next medical appointment. If your blood pressure monitor has a built-in memory, simply take the monitor with you to your next appointment.    Call your provider if you have several high readings. Don't be frightened by a single high blood pressure reading, but if you get several high readings, check in with your healthcare provider.    Note: When blood pressure reaches a systolic (top number) of 180 or higher OR diastolic (bottom number) of 110 or higher, seek emergency medical treatment.  Follow-up care  Because a new blood pressure medicine was started today, it s important that you have your blood pressure rechecked. This is to make sure that the medicine is working and that you have no serious side effects. Keep all your follow up appointments. Write down medicine and blood pressure questions and bring them to your next appointment. If you have pressing concerns about your new medicine or your blood pressure, call your provider. Unless told otherwise, follow up with your health care provider or this facility within the next 3 days.  When to seek medical advice  Call your healthcare provider right away if any of these occur:    Blood pressure reaches a systolic (top number) of 180 or higher, OR diastolic (bottom number) of 110 or higher, seek emergency medical treatment.    Chest pain or shortness of breath    Severe headache    Throbbing or rushing sound in the ears    Nosebleed    Sudden severe pain in your belly (abdomen)    Extreme drowsiness, confusion, or fainting    Dizziness or dizziness with a spinning sensation (vertigo)    Weakness of an arm or  leg or one side of the face    You have problems speaking or seeing   Date Last Reviewed: 12/1/2016 2000-2017 [employer]. [address] [place] [address], [place] [address]. All rights reserved. This information is not intended as a substitute for professional medical care. Always follow your healthcare professional's instructions.        Patient Education    Hydrochlorothiazide Oral capsule    Hydrochlorothiazide Oral tablet  Hydrochlorothiazide Oral tablet  What is this medicine?  HYDROCHLOROTHIAZIDE (tai droe klor oh THYE a zide) is a diuretic. It increases the amount of urine passed, which causes the body to lose salt and water. This medicine is used to treat high blood pressure. It is also reduces the swelling and water retention caused by various medical conditions, such as heart, liver, or kidney disease.  This medicine may be used for other purposes; ask your health care provider or pharmacist if you have questions.  What should I tell my health care provider before I take this medicine?  They need to know if you have any of these conditions:    diabetes    gout    immune system problems, like lupus    kidney disease or kidney stones    liver disease    pancreatitis    small amount of urine or difficulty passing urine    an unusual or allergic reaction to hydrochlorothiazide, sulfa drugs, other medicines, foods, dyes, or preservatives    pregnant or trying to get pregnant    breast-feeding  How should I use this medicine?  Take this medicine by mouth with a glass of water. Follow the directions on the prescription label. Take your medicine at regular intervals. Remember that you will need to pass urine frequently after taking this medicine. Do not take your doses at a time of day that will cause you problems. Do not stop taking your medicine unless your doctor tells you to.  Talk to your pediatrician regarding the use of this medicine in children. Special care may be needed.  Overdosage: If you  think you have taken too much of this medicine contact a poison control center or emergency room at once.  NOTE: This medicine is only for you. Do not share this medicine with others.  What if I miss a dose?  If you miss a dose, take it as soon as you can. If it is almost time for your next dose, take only that dose. Do not take double or extra doses.  What may interact with this medicine?    cholestyramine    colestipol    digoxin    dofetilide    lithium    medicines for blood pressure    medicines for diabetes    medicines that relax muscles for surgery    other diuretics    steroid medicines like prednisone or cortisone  This list may not describe all possible interactions. Give your health care provider a list of all the medicines, herbs, non-prescription drugs, or dietary supplements you use. Also tell them if you smoke, drink alcohol, or use illegal drugs. Some items may interact with your medicine.  What should I watch for while using this medicine?  Visit your doctor or health care professional for regular checks on your progress. Check your blood pressure as directed. Ask your doctor or health care professional what your blood pressure should be and when you should contact him or her.  You may need to be on a special diet while taking this medicine. Ask your doctor.  Check with your doctor or health care professional if you get an attack of severe diarrhea, nausea and vomiting, or if you sweat a lot. The loss of too much body fluid can make it dangerous for you to take this medicine.  You may get drowsy or dizzy. Do not drive, use machinery, or do anything that needs mental alertness until you know how this medicine affects you. Do not stand or sit up quickly, especially if you are an older patient. This reduces the risk of dizzy or fainting spells. Alcohol may interfere with the effect of this medicine. Avoid alcoholic drinks.  This medicine may affect your blood sugar level. If you have diabetes, check  with your doctor or health care professional before changing the dose of your diabetic medicine.  This medicine can make you more sensitive to the sun. Keep out of the sun. If you cannot avoid being in the sun, wear protective clothing and use sunscreen. Do not use sun lamps or tanning beds/booths.  What side effects may I notice from receiving this medicine?  Side effects that you should report to your doctor or health care professional as soon as possible:    allergic reactions such as skin rash or itching, hives, swelling of the lips, mouth, tongue, or throat    changes in vision    chest pain    eye pain    fast or irregular heartbeat    feeling faint or lightheaded, falls    gout attack    muscle pain or cramps    pain or difficulty when passing urine    pain, tingling, numbness in the hands or feet    redness, blistering, peeling or loosening of the skin, including inside the mouth    unusually weak or tired  Side effects that usually do not require medical attention (report to your doctor or health care professional if they continue or are bothersome):    change in sex drive or performance    dry mouth    headache    stomach upset  This list may not describe all possible side effects. Call your doctor for medical advice about side effects. You may report side effects to FDA at 5-750-FDA-6472.  Where should I keep my medicine?  Keep out of the reach of children.  Store at room temperature between 15 and 30 degrees C (59 and 86 degrees F). Do not freeze. Protect from light and moisture. Keep container closed tightly. Throw away any unused medicine after the expiration date.  NOTE:This sheet is a summary. It may not cover all possible information. If you have questions about this medicine, talk to your doctor, pharmacist, or health care provider. Copyright  2016 [employer]        Back Care Tips    Caring for your back  These are things you can do to prevent a recurrence of acute back pain and to reduce  symptoms from chronic back pain:    Maintain a healthy weight. If you are overweight, losing weight will help most types of back pain.    Exercise is an important part of recovery from most types of back pain. The muscles behind and in front of the spine support the back. This means strengthening both the back muscles and the abdominal muscles will provide better support for your spine.     Swimming and brisk walking are good overall exercises to improve your fitness level.    Practice safe lifting methods (below).    Practice good posture when sitting, standing and walking. Avoid prolonged sitting. This puts more stress on the lower back than standing or walking.    Wear quality shoes with sufficient arch support. Foot and ankle alignment can affect back symptoms. Women should avoid wearing high heels.    Therapeutic massage can help relax the back muscles without stretching them.    During the first 24 to 72 hours after an acute injury or flare-up of chronic back pain, apply an ice pack to the painful area for 20 minutes and then remove it for 20 minutes, over a period of 60 to 90 minutes, or several times a day. As a safety precaution, do not use a heating pad at bedtime. Sleeping on a heating pad can lead to skin burns or tissue damage.    You can alternate ice and heat therapies.  Medications  Talk to your healthcare provider before using medicines, especially if you have other medical problems or are taking other medicines.    You may use acetaminophen or ibuprofen to control pain, unless your healthcare provider prescribed other pain medicine. If you have chronic conditions like diabetes, liver or kidney disease, stomach ulcers, or gastrointestinal bleeding, or are taking blood thinners, talk with your healthcare provider before taking any medicines.    Be careful if you are given prescription pain medicines, narcotics, or medicine for muscle spasm. They can cause drowsiness, affect your coordination,  reflexes, and judgment. Do not drive or operate heavy machinery while taking these types of medicines. Take prescription pain medicine only as prescribed by your healthcare provider.  Lumbar stretch  Here is a simple stretching exercise that will help relax muscle spasm and keep your back more limber. If exercise makes your back pain worse, don t do it.    Lie on your back with your knees bent and both feet on the ground.    Slowly raise your left knee to your chest as you flatten your lower back against the floor. Hold for 5 seconds.    Relax and repeat the exercise with your right knee.    Do 10 of these exercises for each leg.  Safe lifting method    Don t bend over at the waist to lift an object off the floor.  Instead, bend your knees and hips in a squat.     Keep your back and head upright    Hold the object close to your body, directly in front of you.    Straighten your legs to lift the object.     Lower the object to the floor in the reverse fashion.    If you must slide something across the floor, push it.  Posture tips  Sitting  Sit in chairs with straight backs or low-back support. Keep your knees lower than your hips, with your feet flat on the floor.  When driving, sit up straight. Adjust the seat forward so you are not leaning toward the steering wheel.  A small pillow or rolled towel behind your lower back may help if you are driving long distances.   Standing  When standing for long periods, shift most of your weight to one leg at a time. Alternate legs every few minutes.   Sleeping  The best way to sleep is on your side with your knees bent. Put a low pillow under your head to support your neck in a neutral spine position. Avoid thick pillows that bend your neck to one side. Put a pillow between your legs to further relax your lower back. If you sleep on your back, put pillows under your knees to support your legs in a slightly flexed position. Use a firm mattress. If your mattress sags, replace  it, or use a 1/2-inch plywood board under the mattress to add support.  Follow-up care  Follow up with your healthcare provider, or as advised.  If X-rays, a CT scan or an MRI scan were taken, they will be reviewed by a radiologist. You will be notified of any new findings that may affect your care.  Call 911  Seek emergency medical care if any of the following occur:    Trouble breathing    Confusion    Very drowsy    Fainting or loss of consciousness    Rapid or very slow heart rate    Loss of  bowel or bladder control  When to seek medical care  Call your healthcare provider if any of the following occur:    Pain becomes worse or spreads to your arms or legs    Weakness or numbness in one or both arms or legs    Numbness in the groin area  Date Last Reviewed: 6/1/2016 2000-2017 [email or web address]. [address] [place] [address], [place] [address]. All rights reserved. This information is not intended as a substitute for professional medical care. Always follow your healthcare professional's instructions.

## 2017-12-13 NOTE — PROGRESS NOTES
SUBJECTIVE:   Christopher Mejia is a 37 year old male who presents to clinic today for the following health issues:      Back Pain     Duration of complaint: Known to have chronic back pain, MRI lumbar spine in July 2017 showed degenerative disc disease involving L4-L5 and S1 along with bilateral nondisplaced L5 pars interarticularis defects. Physical therapy has helped him and he states that symptoms are controlled currently. Denies any radiating leg pain, bowel/bladder or other relevant systemic symptoms.      Forms  Has FMLA paperwork that needs to be filled out      Problem list and histories reviewed & adjusted, as indicated.  Additional history: as documented    Patient Active Problem List   Diagnosis     Body mass index 40 and over, adult     CARDIOVASCULAR SCREENING; LDL GOAL LESS THAN 130     ELO (obstructive sleep apnea)     Gastroesophageal reflux disease without esophagitis     Morbid obesity (H)     Tobacco dependence     Past Surgical History:   Procedure Laterality Date     APPENDECTOMY  1989       Social History   Substance Use Topics     Smoking status: Current Every Day Smoker     Packs/day: 0.50     Types: Cigarettes     Smokeless tobacco: Not on file     Alcohol use Yes      Comment: occ.     Family History   Problem Relation Age of Onset     CANCER Mother      thyroid     Respiratory Paternal Grandmother      Cardiovascular Paternal Grandmother      CANCER Paternal Grandfather          Current Outpatient Prescriptions   Medication Sig Dispense Refill     hydrochlorothiazide (HYDRODIURIL) 25 MG tablet Take 1 tablet (25 mg) by mouth daily 90 tablet 1     pantoprazole (PROTONIX) 20 MG EC tablet Take by mouth 30-60 minutes before a meal. 90 tablet 1     RANITIDINE HCL PO Take by mouth daily       naproxen (NAPROSYN) 500 MG tablet Take 1 tablet (500 mg) by mouth 3 times daily (with meals) 90 tablet 1     IBUPROFEN 200 200 MG OR TABS 1 TABLET EVERY 4 TO 6 HOURS AS NEEDED       varenicline (CHANTIX  STARTING MONTH FELICITAS) 0.5 MG X 11 & 1 MG X 42 tablet Take 0.5 mg tab daily for 3 days, then 0.5 mg tab twice daily for 4 days, then 1 mg twice daily. (Patient not taking: Reported on 12/13/2017) 53 tablet 0     Allergies   Allergen Reactions     Amoxicillin Hives     Recent Labs   Lab Test 05/13/16  03/30/10   1029   A1C   --   5.5   LDL  97  115   HDL  40  39*   TRIG  91  243*   CR   --   1.03   GFRESTIMATED   --   85   GFRESTBLACK   --   >90   POTASSIUM   --   4.7   TSH  2.359   --       BP Readings from Last 3 Encounters:   12/13/17 (!) 160/100   08/08/17 139/86   07/05/17 136/90    Wt Readings from Last 3 Encounters:   12/13/17 (!) 403 lb (182.8 kg)   08/08/17 (!) 396 lb (179.6 kg)   07/05/17 (!) 392 lb (177.8 kg)                  Labs reviewed in EPIC          Reviewed and updated as needed this visit by clinical staffTobacco  Allergies       Reviewed and updated as needed this visit by Provider         ROS:  Constitutional, HEENT, cardiovascular, pulmonary, GI, , musculoskeletal, neuro, skin, endocrine and psych systems are negative, except as otherwise noted.      OBJECTIVE:   BP (!) 160/100  Pulse 79  Temp 98.5  F (36.9  C) (Tympanic)  Wt (!) 403 lb (182.8 kg)  SpO2 98%  BMI 43.19 kg/m2  Body mass index is 43.19 kg/(m^2).  GENERAL: alert and no distress  EYES: Eyes grossly normal to inspection, PERRL and conjunctivae and sclerae normal  NECK: no adenopathy, no asymmetry, masses, or scars and thyroid normal to palpation  RESP: lungs clear to auscultation - no rales, rhonchi or wheezes  CV: regular rate and rhythm, normal S1 S2, no S3 or S4, no murmur, click or rub, no peripheral edema and peripheral pulses strong  ABDOMEN: soft, nontender, no hepatosplenomegaly, no masses and bowel sounds normal  MS: No lumbar/paraspinal tenderness, swelling or skin discoloration, SLR negative bilaterally  NEURO: Normal strength and tone, mentation intact and speech normal  PSYCH: mentation appears normal, affect  normal/bright  LYMPH: no cervical, supraclavicular, axillary, or inguinal adenopathy      ASSESSMENT/PLAN:       1. Benign essential hypertension  - Blood pressure noted to be of particular of less than 140/90, elevated blood pressure has been the trend in the past as well  - Hydrochlorothiazide prescribed, common side effects discussed  - Suggested to monitor blood pressure at home regularly, follow up with RN for repeat blood pressure and lab appointment  - hydrochlorothiazide (HYDRODIURIL) 25 MG tablet; Take 1 tablet (25 mg) by mouth daily  Dispense: 90 tablet; Refill: 1  - Basic metabolic panel; Future      2. DDD (degenerative disc disease), lumbar  - Continue over-the-counter analgesia, symptoms fairly controlled currently  - [facility] paperwork signed, stated that patient can experience flareups and may need a week of medical leave during those episodes      3. Morbid obesity (H)/tobacco dependence  - Healthy lifestyle modifications stressed including regular exercise, balanced diet, weight loss and limiting salt/caffeine/pop intake  - Smoking cessation counseling provided, health hazards explained in detail, not ready to quit currently        Influenza vaccination administered in office today, written information provided as below. All questions answered.  Patient Instructions       Aerobic Exercise for a Healthy Heart  Exercise is a lot more than an energy booster and a stress reliever. It also strengthens your heart muscle, lowers your blood pressure and cholesterol, and burns calories. It can also improve your resting muscle tone, and your mood.     Remember, some activity is better than none.    Choose an aerobic activity  Choose an activity that makes your heart and lungs work harder than they do when you rest or walk normally. This aerobic exercise can improve the way your heart and other muscles use oxygen. Make it fun by exercising with a friend and choosing an activity you enjoy. Here are some  ideas:    Walking    Swimming    Bicycling    Stair climbing    Dancing    Jogging    Gardening  Exercise regularly  If you haven t been exercising regularly,  get your doctor s OK first. Then start slowly.  Here are some tips:    Begin exercising 3 times a week for 5 to 10 minutes at a time.    When you feel comfortable, add a few minutes each session.    Slowly build up to exercising 3 to 4 times each week. Each session should last for 40 minutes, on average, and involve moderate- to vigorous-intensity physical activity.    If you have been given nitroglycerin, be sure to carry it when you exercise.    If you get chest pain (angina) when you re exercising, stop what you re doing, take your nitroglycerin, and call your doctor.  Date Last Reviewed: 6/2/2016 2000-2017 [employer]. [address] [place] [address], [place] [address]. All rights reserved. This information is not intended as a substitute for professional medical care. Always follow your healthcare professional's instructions.        High Blood Pressure, New, Begin Treatment  Your blood pressure was high enough today to start treatment with medicines. Often health care providers don t know what causes high blood pressure (hypertension). But it can be controlled with lifestyle changes and medicines. High blood pressure usually has no symptoms. But it can sometimes cause headache, dizziness, blurred vision, a rushing sound in your ears, chest pain, or shortness of breath. But even without symptoms, high blood pressure that s not treated raises your risk for heart attack and stroke. High blood pressure is a serious health risk and shouldn t be ignored.    A blood pressure reading is made up of two numbers: a higher number over a lower number. The top number is the systolic pressure. The bottom number is the diastolic pressure. A normal blood pressure is a systolic pressure of less than 120 over a diastolic pressure less than 80. You will see your  blood pressure readings written together. For example, a person with a systolic pressure of 118 and a diastolic pressure of 78 will have 118/78 written in the medical record.  High blood pressure is when either the top number is 140 or higher, or the bottom number is 90 or higher. High blood pressure is diagnosed when multiple, separate readings show blood pressures above 140/90. The blood pressures between normal and high are called prehypertension.  Home care  If you have high blood pressure, you should do what is listed below to lower your blood pressure. If you are taking medicines for high blood pressure, these methods may reduce or end your need for medicines in the future.    Begin a weight-loss program if you are overweight.    Cut back on how much salt you get in your diet. Here s how to do this:    Don t eat foods that have a lot of salt. These include olives, pickles, smoked meats, and salted potato chips.    Don t add salt to your food at the table.    Use only small amounts of salt when cooking.    Review food labels to track how much salt is in prepared foods.    When eating out, ask that no additional salt be added to your food order.    Begin an exercise program. Talk with your health care provider about the type of exercise program that would be best for you. It doesn't have to be hard. Even brisk walking for 20 minutes 3 times a week is a good form of exercise.    Don t take medicines that have heart stimulants. This includes many over-the-counter cold and sinus decongestant pills and sprays, as well as diet pills. Check the warnings about hypertension on the label. Before purchasing any over-the-counter medicines or supplements, always ask the pharmacist about the product's potential interaction with your high blood pressure and your high blood pressure medicines.    Stimulants such as amphetamine or cocaine could be lethal for someone with high blood pressure. Never take these.    Limit how much  caffeine you get in your diet. Switch to caffeine-free products.    Stop smoking. If you are a long-time smoker, this can be hard. Enroll in a stop-smoking program to make it more likely that you will quit for good.    Learn how to handle stress. This is an important part of any program to lower blood pressure. Learn about relaxation methods like meditation, yoga, or biofeedback.    If your provider prescribed medicines, take them exactly as directed. Missing doses may cause your blood pressure get out of control.    If you miss a dose or doses, check with your healthcare provider or pharmacist about what to do.    Consider buying an automatic blood pressure machine. Your provider can make a recommendation. You can get one of these at most pharmacies.  The American Heart Association recommends the following guidelines for home blood pressure monitoring:    Don't smoke or drink coffee for 30 minutes before taking your blood pressure.    Go to the bathroom before the test.    Relax for 5 minutes before taking the measurement.    Sit with your back supported (don't sit on a couch or soft chair); keep your feet on the floor uncrossed. Place your arm on a solid flat surface (like a table) with the upper part of the arm at heart level. Place the middle of the cuff directly above the eye of the elbow. Check the monitor's instruction manual for an illustration.    Take multiple readings. When you measure, take 2 to 3 readings one minute apart and record all of the results.    Take your blood pressure at the same time every day, or as your healthcare provider recommends.    Record the date, time, and blood pressure reading.    Take the record with you to your next medical appointment. If your blood pressure monitor has a built-in memory, simply take the monitor with you to your next appointment.    Call your provider if you have several high readings. Don't be frightened by a single high blood pressure reading, but if you  get several high readings, check in with your healthcare provider.    Note: When blood pressure reaches a systolic (top number) of 180 or higher OR diastolic (bottom number) of 110 or higher, seek emergency medical treatment.  Follow-up care  Because a new blood pressure medicine was started today, it s important that you have your blood pressure rechecked. This is to make sure that the medicine is working and that you have no serious side effects. Keep all your follow up appointments. Write down medicine and blood pressure questions and bring them to your next appointment. If you have pressing concerns about your new medicine or your blood pressure, call your provider. Unless told otherwise, follow up with your health care provider or this facility within the next 3 days.  When to seek medical advice  Call your healthcare provider right away if any of these occur:    Blood pressure reaches a systolic (top number) of 180 or higher, OR diastolic (bottom number) of 110 or higher, seek emergency medical treatment.    Chest pain or shortness of breath    Severe headache    Throbbing or rushing sound in the ears    Nosebleed    Sudden severe pain in your belly (abdomen)    Extreme drowsiness, confusion, or fainting    Dizziness or dizziness with a spinning sensation (vertigo)    Weakness of an arm or leg or one side of the face    You have problems speaking or seeing   Date Last Reviewed: 12/1/2016 2000-2017 [employer]. [address] [place] [address]. All rights reserved. This information is not intended as a substitute for professional medical care. Always follow your healthcare professional's instructions.        Patient Education    Hydrochlorothiazide Oral capsule    Hydrochlorothiazide Oral tablet  Hydrochlorothiazide Oral tablet  What is this medicine?  HYDROCHLOROTHIAZIDE (tai droe klor oh THYE a zide) is a diuretic. It increases the amount of urine passed, which causes the body to  lose salt and water. This medicine is used to treat high blood pressure. It is also reduces the swelling and water retention caused by various medical conditions, such as heart, liver, or kidney disease.  This medicine may be used for other purposes; ask your health care provider or pharmacist if you have questions.  What should I tell my health care provider before I take this medicine?  They need to know if you have any of these conditions:    diabetes    gout    immune system problems, like lupus    kidney disease or kidney stones    liver disease    pancreatitis    small amount of urine or difficulty passing urine    an unusual or allergic reaction to hydrochlorothiazide, sulfa drugs, other medicines, foods, dyes, or preservatives    pregnant or trying to get pregnant    breast-feeding  How should I use this medicine?  Take this medicine by mouth with a glass of water. Follow the directions on the prescription label. Take your medicine at regular intervals. Remember that you will need to pass urine frequently after taking this medicine. Do not take your doses at a time of day that will cause you problems. Do not stop taking your medicine unless your doctor tells you to.  Talk to your pediatrician regarding the use of this medicine in children. Special care may be needed.  Overdosage: If you think you have taken too much of this medicine contact a poison control center or emergency room at once.  NOTE: This medicine is only for you. Do not share this medicine with others.  What if I miss a dose?  If you miss a dose, take it as soon as you can. If it is almost time for your next dose, take only that dose. Do not take double or extra doses.  What may interact with this medicine?    cholestyramine    colestipol    digoxin    dofetilide    lithium    medicines for blood pressure    medicines for diabetes    medicines that relax muscles for surgery    other diuretics    steroid medicines like prednisone or  cortisone  This list may not describe all possible interactions. Give your health care provider a list of all the medicines, herbs, non-prescription drugs, or dietary supplements you use. Also tell them if you smoke, drink alcohol, or use illegal drugs. Some items may interact with your medicine.  What should I watch for while using this medicine?  Visit your doctor or health care professional for regular checks on your progress. Check your blood pressure as directed. Ask your doctor or health care professional what your blood pressure should be and when you should contact him or her.  You may need to be on a special diet while taking this medicine. Ask your doctor.  Check with your doctor or health care professional if you get an attack of severe diarrhea, nausea and vomiting, or if you sweat a lot. The loss of too much body fluid can make it dangerous for you to take this medicine.  You may get drowsy or dizzy. Do not drive, use machinery, or do anything that needs mental alertness until you know how this medicine affects you. Do not stand or sit up quickly, especially if you are an older patient. This reduces the risk of dizzy or fainting spells. Alcohol may interfere with the effect of this medicine. Avoid alcoholic drinks.  This medicine may affect your blood sugar level. If you have diabetes, check with your doctor or health care professional before changing the dose of your diabetic medicine.  This medicine can make you more sensitive to the sun. Keep out of the sun. If you cannot avoid being in the sun, wear protective clothing and use sunscreen. Do not use sun lamps or tanning beds/booths.  What side effects may I notice from receiving this medicine?  Side effects that you should report to your doctor or health care professional as soon as possible:    allergic reactions such as skin rash or itching, hives, swelling of the lips, mouth, tongue, or throat    changes in vision    chest pain    eye pain    fast  or irregular heartbeat    feeling faint or lightheaded, falls    gout attack    muscle pain or cramps    pain or difficulty when passing urine    pain, tingling, numbness in the hands or feet    redness, blistering, peeling or loosening of the skin, including inside the mouth    unusually weak or tired  Side effects that usually do not require medical attention (report to your doctor or health care professional if they continue or are bothersome):    change in sex drive or performance    dry mouth    headache    stomach upset  This list may not describe all possible side effects. Call your doctor for medical advice about side effects. You may report side effects to FDA at 8-048-NWL-4820.  Where should I keep my medicine?  Keep out of the reach of children.  Store at room temperature between 15 and 30 degrees C (59 and 86 degrees F). Do not freeze. Protect from light and moisture. Keep container closed tightly. Throw away any unused medicine after the expiration date.  NOTE:This sheet is a summary. It may not cover all possible information. If you have questions about this medicine, talk to your doctor, pharmacist, or health care provider. Copyright  2016 [employer]        Back Care Tips    Caring for your back  These are things you can do to prevent a recurrence of acute back pain and to reduce symptoms from chronic back pain:    Maintain a healthy weight. If you are overweight, losing weight will help most types of back pain.    Exercise is an important part of recovery from most types of back pain. The muscles behind and in front of the spine support the back. This means strengthening both the back muscles and the abdominal muscles will provide better support for your spine.     Swimming and brisk walking are good overall exercises to improve your fitness level.    Practice safe lifting methods (below).    Practice good posture when sitting, standing and walking. Avoid prolonged sitting. This puts more stress  on the lower back than standing or walking.    Wear quality shoes with sufficient arch support. Foot and ankle alignment can affect back symptoms. Women should avoid wearing high heels.    Therapeutic massage can help relax the back muscles without stretching them.    During the first 24 to 72 hours after an acute injury or flare-up of chronic back pain, apply an ice pack to the painful area for 20 minutes and then remove it for 20 minutes, over a period of 60 to 90 minutes, or several times a day. As a safety precaution, do not use a heating pad at bedtime. Sleeping on a heating pad can lead to skin burns or tissue damage.    You can alternate ice and heat therapies.  Medications  Talk to your healthcare provider before using medicines, especially if you have other medical problems or are taking other medicines.    You may use acetaminophen or ibuprofen to control pain, unless your healthcare provider prescribed other pain medicine. If you have chronic conditions like diabetes, liver or kidney disease, stomach ulcers, or gastrointestinal bleeding, or are taking blood thinners, talk with your healthcare provider before taking any medicines.    Be careful if you are given prescription pain medicines, narcotics, or medicine for muscle spasm. They can cause drowsiness, affect your coordination, reflexes, and judgment. Do not drive or operate heavy machinery while taking these types of medicines. Take prescription pain medicine only as prescribed by your healthcare provider.  Lumbar stretch  Here is a simple stretching exercise that will help relax muscle spasm and keep your back more limber. If exercise makes your back pain worse, don t do it.    Lie on your back with your knees bent and both feet on the ground.    Slowly raise your left knee to your chest as you flatten your lower back against the floor. Hold for 5 seconds.    Relax and repeat the exercise with your right knee.    Do 10 of these exercises for each  leg.  Safe lifting method    Don t bend over at the waist to lift an object off the floor.  Instead, bend your knees and hips in a squat.     Keep your back and head upright    Hold the object close to your body, directly in front of you.    Straighten your legs to lift the object.     Lower the object to the floor in the reverse fashion.    If you must slide something across the floor, push it.  Posture tips  Sitting  Sit in chairs with straight backs or low-back support. Keep your knees lower than your hips, with your feet flat on the floor.  When driving, sit up straight. Adjust the seat forward so you are not leaning toward the steering wheel.  A small pillow or rolled towel behind your lower back may help if you are driving long distances.   Standing  When standing for long periods, shift most of your weight to one leg at a time. Alternate legs every few minutes.   Sleeping  The best way to sleep is on your side with your knees bent. Put a low pillow under your head to support your neck in a neutral spine position. Avoid thick pillows that bend your neck to one side. Put a pillow between your legs to further relax your lower back. If you sleep on your back, put pillows under your knees to support your legs in a slightly flexed position. Use a firm mattress. If your mattress sags, replace it, or use a 1/2-inch plywood board under the mattress to add support.  Follow-up care  Follow up with your healthcare provider, or as advised.  If X-rays, a CT scan or an MRI scan were taken, they will be reviewed by a radiologist. You will be notified of any new findings that may affect your care.  Call 911  Seek emergency medical care if any of the following occur:    Trouble breathing    Confusion    Very drowsy    Fainting or loss of consciousness    Rapid or very slow heart rate    Loss of  bowel or bladder control  When to seek medical care  Call your healthcare provider if any of the following occur:    Pain becomes  worse or spreads to your arms or legs    Weakness or numbness in one or both arms or legs    Numbness in the groin area  Date Last Reviewed: 6/1/2016 2000-2017 [employer]. [address] [place] [address], [place] [address]. All rights reserved. This information is not intended as a substitute for professional medical care. Always follow your healthcare professional's instructions.            Von Cruz MD  [facility]

## 2017-12-13 NOTE — NURSING NOTE
"Chief Complaint   Patient presents with     Back Pain     Forms     [facility] paperwork to fill out       Initial BP (!) 138/100  Pulse 79  Temp 98.5  F (36.9  C) (Tympanic)  Wt (!) 403 lb (182.8 kg)  SpO2 98%  BMI 43.19 kg/m2 Estimated body mass index is 43.19 kg/(m^2) as calculated from the following:    Height as of 8/8/17: 6' 9\" (2.057 m).    Weight as of this encounter: 403 lb (182.8 kg).  Medication Reconciliation: complete    Health Maintenance that is potentially due pending provider review:  NONE    n/a    Is there anyone who you would like to be able to receive your results? No  If yes have patient fill out CASPER      "

## 2017-12-13 NOTE — PROGRESS NOTES

## 2017-12-13 NOTE — LETTER
[address] [address]  [phone]        December 27, 2018    Christopher Mejia  [address] [facility] [address]              Dear Christopher Mejia    This is to remind you that your non fasting labwork is due.    You may call our office at  to schedule an appointment.    Please disregard this notice if you have already had your labs drawn or made an appointment.        Sincerely,        Von Cruz MD

## 2018-04-26 DIAGNOSIS — K21.9 GASTROESOPHAGEAL REFLUX DISEASE WITHOUT ESOPHAGITIS: ICD-10-CM

## 2018-04-26 RX ORDER — PANTOPRAZOLE SODIUM 20 MG/1
TABLET, DELAYED RELEASE ORAL
Qty: 90 TABLET | Refills: 0 | Status: SHIPPED | OUTPATIENT
Start: 2018-04-26 | End: 2023-12-06

## 2018-07-24 ENCOUNTER — ALLIED HEALTH/NURSE VISIT (OUTPATIENT)
Dept: FAMILY MEDICINE | Facility: CLINIC | Age: 38
End: 2018-07-24
Payer: COMMERCIAL

## 2018-07-24 VITALS — DIASTOLIC BLOOD PRESSURE: 65 MMHG | SYSTOLIC BLOOD PRESSURE: 119 MMHG

## 2018-07-24 DIAGNOSIS — I10 BENIGN ESSENTIAL HYPERTENSION: Primary | ICD-10-CM

## 2018-07-24 PROCEDURE — 99207 ZZC NO CHARGE NURSE ONLY: CPT | Performed by: FAMILY MEDICINE

## 2018-07-24 NOTE — MR AVS SNAPSHOT
"              After Visit Summary   2018    Christopher Mejia    MRN: [id number]           Patient Information     Date Of Birth          1980        Visit Information        Provider Department      2018 2:19 PM Von Cruz MD [facility]        Today's Diagnoses     Benign essential hypertension    -  1       Follow-ups after your visit        Who to contact     If you have questions or need follow up information about today's clinic visit or your schedule please contact [facility] directly at [phone].  Normal or non-critical lab and imaging results will be communicated to you by MyChart, letter or phone within 4 business days after the clinic has received the results. If you do not hear from us within 7 days, please contact the clinic through MyChart or phone. If you have a critical or abnormal lab result, we will notify you by phone as soon as possible.  Submit refill requests through [employer] or call your pharmacy and they will forward the refill request to us. Please allow 3 business days for your refill to be completed.          Additional Information About Your Visit        MyChart Information     [employer] lets you send messages to your doctor, view your test results, renew your prescriptions, schedule appointments and more. To sign up, go to [email or web address] . Click on \"Log in\" on the left side of the screen, which will take you to the Welcome page. Then click on \"Sign up Now\" on the right side of the page.     You will be asked to enter the access code listed below, as well as some personal information. Please follow the directions to create your username and password.     Your access code is: [email or web address]  Expires: 10/22/2018  2:22 PM     Your access code will  in 90 days. If you need help or a new code, please call your [facility] or [phone].        Care EveryWhere ID     This is your Care EveryWhere ID. This could be used by " other organizations to access your [place] medical records  [id number]         Blood Pressure from Last 3 Encounters:   07/24/18 119/65   12/13/17 (!) 160/100   08/08/17 139/86    Weight from Last 3 Encounters:   12/13/17 (!) 403 lb (182.8 kg)   08/08/17 (!) 396 lb (179.6 kg)   07/05/17 (!) 392 lb (177.8 kg)              Today, you had the following     No orders found for display       Primary Care Provider Office Phone # Fax #    Gonzales Villalpando [phone]       [address] [facility] [address]        Equal Access to Services     CARLOS HUBBARD : Pietro morris Sosally, waelenada mercedes, qaybta kaalmada adetre, aslvador gonzalez . So [facility] [phone].    ATENCIÓN: Si habla español, tiene a matos disposición servicios gratuitos de asistencia lingüística. Llame al [phone].    We comply with applicable federal civil rights laws and [place] laws. We do not discriminate on the basis of race, color, national origin, age, disability, sex, sexual orientation, or gender identity.            Thank you!     Thank you for choosing [facility]  for your care. Our goal is always to provide you with excellent care. Hearing back from our patients is one way we can continue to improve our services. Please take a few minutes to complete the written survey that you may receive in the mail after your visit with us. Thank you!             Your Updated Medication List - Protect others around you: Learn how to safely use, store and throw away your medicines at www.disposemymeds.org.          This list is accurate as of 7/24/18  2:22 PM.  Always use your most recent med list.                   Brand Name Dispense Instructions for use Diagnosis    hydrochlorothiazide 25 MG tablet    HYDRODIURIL    90 tablet    Take 1 tablet (25 mg) by mouth daily    Benign essential hypertension       IBUPROFEN 200 200 MG Tabs      1 TABLET EVERY 4 TO 6 HOURS AS NEEDED         naproxen 500 MG tablet    NAPROSYN    90 tablet    Take 1 tablet (500 mg) by mouth 3 times daily (with meals)    Acute bilateral low back pain, with sciatica presence unspecified       pantoprazole 20 MG EC tablet    PROTONIX    90 tablet    TAKE ONE TABLET BY MOUTH 30-60 MINUTES BEFORE A MEAL    Gastroesophageal reflux disease without esophagitis       RANITIDINE HCL PO      Take by mouth daily        varenicline 0.5 MG X 11 & 1 MG X 42 tablet    CHANTIX STARTING MONTH FELICITAS    53 tablet    Take 0.5 mg tab daily for 3 days, then 0.5 mg tab twice daily for 4 days, then 1 mg twice daily.    Tobacco abuse

## 2018-12-26 ENCOUNTER — OFFICE VISIT (OUTPATIENT)
Dept: FAMILY MEDICINE | Facility: CLINIC | Age: 38
End: 2018-12-26
Payer: COMMERCIAL

## 2018-12-26 VITALS
HEIGHT: 78 IN | HEART RATE: 62 BPM | BODY MASS INDEX: 36.45 KG/M2 | TEMPERATURE: 98.1 F | RESPIRATION RATE: 24 BRPM | OXYGEN SATURATION: 99 % | DIASTOLIC BLOOD PRESSURE: 84 MMHG | WEIGHT: 315 LBS | SYSTOLIC BLOOD PRESSURE: 136 MMHG

## 2018-12-26 DIAGNOSIS — G89.29 CHRONIC BILATERAL LOW BACK PAIN, WITH SCIATICA PRESENCE UNSPECIFIED: Primary | ICD-10-CM

## 2018-12-26 DIAGNOSIS — M54.5 CHRONIC BILATERAL LOW BACK PAIN, WITH SCIATICA PRESENCE UNSPECIFIED: Primary | ICD-10-CM

## 2018-12-26 PROCEDURE — 99213 OFFICE O/P EST LOW 20 MIN: CPT | Performed by: FAMILY MEDICINE

## 2018-12-26 SDOH — HEALTH STABILITY: MENTAL HEALTH: HOW MANY STANDARD DRINKS CONTAINING ALCOHOL DO YOU HAVE ON A TYPICAL DAY?: 1 OR 2

## 2018-12-26 SDOH — HEALTH STABILITY: MENTAL HEALTH: HOW OFTEN DO YOU HAVE A DRINK CONTAINING ALCOHOL?: MONTHLY OR LESS

## 2018-12-26 SDOH — HEALTH STABILITY: MENTAL HEALTH: HOW OFTEN DO YOU HAVE 6 OR MORE DRINKS ON ONE OCCASION?: NEVER

## 2018-12-26 ASSESSMENT — PAIN SCALES - GENERAL: PAINLEVEL: SEVERE PAIN (7)

## 2018-12-26 ASSESSMENT — MIFFLIN-ST. JEOR: SCORE: 2881.02

## 2018-12-26 NOTE — NURSING NOTE
"Chief Complaint   Patient presents with     Back Pain       Initial There were no vitals taken for this visit. Estimated body mass index is 43.19 kg/m  as calculated from the following:    Height as of 8/8/17: 2.057 m (6' 9\").    Weight as of 12/13/17: 182.8 kg (403 lb).    Patient presents to the clinic using No DME    Health Maintenance that is potentially due pending provider review:  NONE    n/a    Is there anyone who you would like to be able to receive your results? not asked  If yes have patient fill out CASPER    "

## 2018-12-26 NOTE — PATIENT INSTRUCTIONS
Patient Education     Back Care Tips    Caring for your back  These are things you can do to prevent a recurrence of acute back pain and to reduce symptoms from chronic back pain:    Maintain a healthy weight. If you are overweight, losing weight will help most types of back pain.    Exercise is an important part of recovery from most types of back pain. The muscles behind and in front of the spine support the back. This means strengthening both the back muscles and the abdominal muscles will provide better support for your spine.     Swimming and brisk walking are good overall exercises to improve your fitness level.    Practice safe lifting methods (below).    Practice good posture when sitting, standing and walking. Avoid prolonged sitting. This puts more stress on the lower back than standing or walking.    Wear quality shoes with sufficient arch support. Foot and ankle alignment can affect back symptoms. Women should avoid wearing high heels.    Therapeutic massage can help relax the back muscles without stretching them.    During the first 24 to 72 hours after an acute injury or flare-up of chronic back pain, apply an ice pack to the painful area for 20 minutes and then remove it for 20 minutes, over a period of 60 to 90 minutes, or several times a day. As a safety precaution, do not use a heating pad at bedtime. Sleeping on a heating pad can lead to skin burns or tissue damage.    You can alternate ice and heat therapies.  Medicines  Talk to your healthcare provider before using medicines, especially if you have other medical problems or are taking other medicines.    You may use acetaminophen or ibuprofen to control pain, unless your healthcare provider prescribed other pain medicine. If you have chronic conditions like diabetes, liver or kidney disease, stomach ulcers, or gastrointestinal bleeding, or are taking blood thinners, talk with your healthcare provider before taking any medicines.    Be careful  if you are given prescription pain medicines, narcotics, or medicine for muscle spasm. They can cause drowsiness, affect your coordination, reflexes, and judgment. Do not drive or operate heavy machinery while taking these types of medicines. Take prescription pain medicine only as prescribed by your healthcare provider.  Lumbar stretch  Here is a simple stretching exercise that will help relax muscle spasm and keep your back more limber. If exercise makes your back pain worse, don t do it.    Lie on your back with your knees bent and both feet on the ground.    Slowly raise your left knee to your chest as you flatten your lower back against the floor. Hold for 5 seconds.    Relax and repeat the exercise with your right knee.    Do 10 of these exercises for each leg.  Safe lifting method    Don t bend over at the waist to lift an object off the floor.  Instead, bend your knees and hips in a squat.     Keep your back and head upright    Hold the object close to your body, directly in front of you.    Straighten your legs to lift the object.     Lower the object to the floor in the reverse fashion.    If you must slide something across the floor, push it.  Posture tips  Sitting  Sit in chairs with straight backs or low-back support. Keep your knees lower than your hips, with your feet flat on the floor.  When driving, sit up straight. Adjust the seat forward so you are not leaning toward the steering wheel.  A small pillow or rolled towel behind your lower back may help if you are driving long distances.   Standing  When standing for long periods, shift most of your weight to one leg at a time. Alternate legs every few minutes.   Sleeping  The best way to sleep is on your side with your knees bent. Put a low pillow under your head to support your neck in a neutral spine position. Avoid thick pillows that bend your neck to one side. Put a pillow between your legs to further relax your lower back. If you sleep on your  back, put pillows under your knees to support your legs in a slightly flexed position. Use a firm mattress. If your mattress sags, replace it, or use a 1/2-inch plywood board under the mattress to add support.  Follow-up care  Follow up with your healthcare provider, or as advised.  If X-rays, a CT scan or an MRI scan were taken, they will be reviewed by a radiologist. You will be notified of any new findings that may affect your care.  Call 911  Call 911 if any of the following occur:    Trouble breathing    Confusion    Very drowsy    Fainting or loss of consciousness    Rapid or very slow heart rate    Loss of  bowel or bladder control  When to seek medical advice  Call your healthcare provider right away if any of the following occur:    Pain becomes worse or spreads to your arms or legs    Weakness or numbness in one or both arms or legs    Numbness in the groin area  Date Last Reviewed: 6/1/2016 2000-2018 [employer]. [address] [place] [address], [place] [address]. All rights reserved. This information is not intended as a substitute for professional medical care. Always follow your healthcare professional's instructions.

## 2018-12-26 NOTE — PROGRESS NOTES
SUBJECTIVE:   Christopher Mejia is a 38 year old male who presents to clinic today for the following health issues:  Chief Complaint   Patient presents with     Back Pain     Forms     FMLA     Medication Reconciliation     not taking HCTZ because doesnt remember         Back Pain       Duration: Acute on chronic        Specific cause: none    Description:   Location of pain: low back middle and middle of back middle  Character of pain: sharp and dull ache  Pain radiation:radiates into the right buttocks  New numbness or weakness in legs, not attributed to pain:  no     Intensity: Currently 7/10    History:   Pain interferes with job: YES  History of back problems: previous degenerative joint disease of the lumbar spine  Any previous MRI or X-rays: Yes- at [place].  Date 7/6/2017 1. Degenerative disc disease L4-L5 with mild right foraminal stenosis.  2. Early degenerative disc disease L5-S1 with no direct impingement on  neural structures.  3. Bilateral nondisplaced L5 pars interarticularis defects.  Sees a specialist for back pain:  No  Therapies tried without relief: activity    Alleviating factors:   Improved by: heat, prednisone, ice, muscle relaxants and rest      Precipitating factors:  Worsened by: Lifting, driving and Bending  Is a  and seat bouncing causes more pain            Accompanying Signs & Symptoms:  Risk of Fracture:  None  Risk of Cauda Equina:  None  Risk of Infection:  None  Risk of Cancer:  None  Risk of Ankylosing Spondylitis:  Onset at age <35, male, AND morning back stiffness. no         Problem list and histories reviewed & adjusted, as indicated.  Additional history: as documented    Patient Active Problem List   Diagnosis     Body mass index 40 and over, adult     CARDIOVASCULAR SCREENING; LDL GOAL LESS THAN 130     ELO (obstructive sleep apnea)     Gastroesophageal reflux disease without esophagitis     Morbid obesity (H)     Tobacco dependence     Benign essential  "hypertension     Past Surgical History:   Procedure Laterality Date     APPENDECTOMY  1989       Social History     Tobacco Use     Smoking status: Current Every Day Smoker     Packs/day: 1.00     Types: Cigarettes     Smokeless tobacco: Never Used   Substance Use Topics     Alcohol use: Yes     Frequency: Monthly or less     Drinks per session: 1 or 2     Binge frequency: Never     Family History   Problem Relation Age of Onset     Cancer Mother         thyroid     Respiratory Paternal Grandmother      Cardiovascular Paternal Grandmother      Cancer Paternal Grandfather          Current Outpatient Medications   Medication Sig Dispense Refill     naproxen (NAPROSYN) 500 MG tablet Take 1 tablet (500 mg) by mouth 3 times daily (with meals) 90 tablet 1     pantoprazole (PROTONIX) 20 MG EC tablet TAKE ONE TABLET BY MOUTH 30-60 MINUTES BEFORE A MEAL 90 tablet 0     Allergies   Allergen Reactions     Amoxicillin Hives     Recent Labs   Lab Test 05/13/16   LDL 97   HDL 40   TRIG 91   TSH 2.359      BP Readings from Last 3 Encounters:   12/26/18 136/84   07/24/18 119/65   12/13/17 (!) 160/100    Wt Readings from Last 3 Encounters:   12/26/18 (!) 182 kg (401 lb 3.2 oz)   12/13/17 (!) 182.8 kg (403 lb)   08/08/17 (!) 179.6 kg (396 lb)                  Labs reviewed in EPIC    Reviewed and updated as needed this visit by clinical staff       Reviewed and updated as needed this visit by Provider         ROS:  Constitutional, HEENT, cardiovascular, pulmonary, gi and gu systems are negative, except as otherwise noted.    OBJECTIVE:     /84   Pulse 62   Temp 98.1  F (36.7  C)   Resp 24   Ht 1.994 m (6' 6.5\")   Wt (!) 182 kg (401 lb 3.2 oz)   SpO2 99%   BMI 45.77 kg/m    Body mass index is 45.77 kg/m .  GENERAL: alert and no distress  EYES: Eyes grossly normal to inspection, PERRL and conjunctivae and sclerae normal  NECK: no adenopathy, no asymmetry, masses, or scars and thyroid normal to palpation  RESP: lungs clear " to auscultation - no rales, rhonchi or wheezes  CV: regular rates and rhythm, normal S1 S2, no S3 or S4 and no murmur, click or rub  ABDOMEN: soft, nontender, no hepatosplenomegaly, no masses and bowel sounds normal  MS: subjective low back pain, no skin discoloration or swelling noted, SLR negative bilaterally, reflexes 1+, normal lower extremity strength, sensation to touch and pressure intact  NEURO: Normal strength and tone, mentation intact and speech normal  PSYCH: mentation appears normal, affect normal/bright    ASSESSMENT/PLAN:         ICD-10-CM    1. Chronic bilateral low back pain, with sciatica presence unspecified M54.5 PHYSICAL THERAPY REFERRAL    G89.29 ORTHO  REFERRAL       38-year-old male presents with acute on chronic low back pain, symptoms flared up recently, was involved with household chores during [place].  MRI lumbar spine back in July 2017 showed degenerative disc disease at L4-L5, L5-S1.  Physical examination as described above.  Suggested naproxen, rest, icing and activity as tolerated.  Recommended to start physical therapy if symptoms persist.  Workability note and written information provided.  Patient/wife understood and in agreement with above plan.  All questions answered.        Patient Instructions     Patient Education     Back Care Tips    Caring for your back  These are things you can do to prevent a recurrence of acute back pain and to reduce symptoms from chronic back pain:    Maintain a healthy weight. If you are overweight, losing weight will help most types of back pain.    Exercise is an important part of recovery from most types of back pain. The muscles behind and in front of the spine support the back. This means strengthening both the back muscles and the abdominal muscles will provide better support for your spine.     Swimming and brisk walking are good overall exercises to improve your fitness level.    Practice safe lifting methods (below).    Practice  good posture when sitting, standing and walking. Avoid prolonged sitting. This puts more stress on the lower back than standing or walking.    Wear quality shoes with sufficient arch support. Foot and ankle alignment can affect back symptoms. Women should avoid wearing high heels.    Therapeutic massage can help relax the back muscles without stretching them.    During the first 24 to 72 hours after an acute injury or flare-up of chronic back pain, apply an ice pack to the painful area for 20 minutes and then remove it for 20 minutes, over a period of 60 to 90 minutes, or several times a day. As a safety precaution, do not use a heating pad at bedtime. Sleeping on a heating pad can lead to skin burns or tissue damage.    You can alternate ice and heat therapies.  Medicines  Talk to your healthcare provider before using medicines, especially if you have other medical problems or are taking other medicines.    You may use acetaminophen or ibuprofen to control pain, unless your healthcare provider prescribed other pain medicine. If you have chronic conditions like diabetes, liver or kidney disease, stomach ulcers, or gastrointestinal bleeding, or are taking blood thinners, talk with your healthcare provider before taking any medicines.    Be careful if you are given prescription pain medicines, narcotics, or medicine for muscle spasm. They can cause drowsiness, affect your coordination, reflexes, and judgment. Do not drive or operate heavy machinery while taking these types of medicines. Take prescription pain medicine only as prescribed by your healthcare provider.  Lumbar stretch  Here is a simple stretching exercise that will help relax muscle spasm and keep your back more limber. If exercise makes your back pain worse, don t do it.    Lie on your back with your knees bent and both feet on the ground.    Slowly raise your left knee to your chest as you flatten your lower back against the floor. Hold for 5  seconds.    Relax and repeat the exercise with your right knee.    Do 10 of these exercises for each leg.  Safe lifting method    Don t bend over at the waist to lift an object off the floor.  Instead, bend your knees and hips in a squat.     Keep your back and head upright    Hold the object close to your body, directly in front of you.    Straighten your legs to lift the object.     Lower the object to the floor in the reverse fashion.    If you must slide something across the floor, push it.  Posture tips  Sitting  Sit in chairs with straight backs or low-back support. Keep your knees lower than your hips, with your feet flat on the floor.  When driving, sit up straight. Adjust the seat forward so you are not leaning toward the steering wheel.  A small pillow or rolled towel behind your lower back may help if you are driving long distances.   Standing  When standing for long periods, shift most of your weight to one leg at a time. Alternate legs every few minutes.   Sleeping  The best way to sleep is on your side with your knees bent. Put a low pillow under your head to support your neck in a neutral spine position. Avoid thick pillows that bend your neck to one side. Put a pillow between your legs to further relax your lower back. If you sleep on your back, put pillows under your knees to support your legs in a slightly flexed position. Use a firm mattress. If your mattress sags, replace it, or use a 1/2-inch plywood board under the mattress to add support.  Follow-up care  Follow up with your healthcare provider, or as advised.  If X-rays, a CT scan or an MRI scan were taken, they will be reviewed by a radiologist. You will be notified of any new findings that may affect your care.  Call 911  Call 911 if any of the following occur:    Trouble breathing    Confusion    Very drowsy    Fainting or loss of consciousness    Rapid or very slow heart rate    Loss of  bowel or bladder control  When to seek medical  advice  Call your healthcare provider right away if any of the following occur:    Pain becomes worse or spreads to your arms or legs    Weakness or numbness in one or both arms or legs    Numbness in the groin area  Date Last Reviewed: 6/1/2016 2000-2018 [employer]. [address] [place] [address]. All rights reserved. This information is not intended as a substitute for professional medical care. Always follow your healthcare professional's instructions.               Von Cruz MD  [facility]

## 2019-01-10 ENCOUNTER — TELEPHONE (OUTPATIENT)
Dept: FAMILY MEDICINE | Facility: CLINIC | Age: 39
End: 2019-01-10

## 2019-01-10 NOTE — TELEPHONE ENCOUNTER
Patient dropped off [facility] paperwork. Given to Dr. Cruz to complete. Please fax to [phone] and call [phone] when complete.    Sarina Luc[facility]-Station

## 2019-02-06 ENCOUNTER — TELEPHONE (OUTPATIENT)
Dept: FAMILY MEDICINE | Facility: CLINIC | Age: 39
End: 2019-02-06

## 2019-02-06 NOTE — TELEPHONE ENCOUNTER
Patient was told to return for a BMP, reminder letter was sent to patient on 12/27/2018, patient has still not scheduled an appointment.

## 2020-03-24 ENCOUNTER — TRANSFERRED RECORDS (OUTPATIENT)
Dept: HEALTH INFORMATION MANAGEMENT | Facility: CLINIC | Age: 40
End: 2020-03-24

## 2020-03-24 ENCOUNTER — TELEPHONE (OUTPATIENT)
Dept: FAMILY MEDICINE | Facility: CLINIC | Age: 40
End: 2020-03-24

## 2020-03-24 NOTE — TELEPHONE ENCOUNTER
Reports neck pain radiating into rt shoulder region past couple weeks. Developed rt side facial numbness Sat fahad, sx persist, notices slight rt side mouth drooping resulting in some difficulty managing food/ fluids.    Denies tingling, pain. Slight headache due to shoulder/ neck pain. Denies dizziness, lightheadedness, extremity weakness. No vision changes. Alert, oriented- denies confusion. No recent injury/strain.   Denies having similar sx in past.   RN discussed with Dr. Cruz who advises ED for face to face eval, likely will need imaging.  Pt informed, voices understanding/ agreement.  JANNIE Duggan, RN

## 2020-03-24 NOTE — TELEPHONE ENCOUNTER
Reason for Call:  Other call back    Detailed comments: Patient is calling because he is having some neck and shoulder pain, and numbness right side of face. Patient declined to speak with triage nurse as he stated he already spoke with someone.  Patient would like a call back.     Phone Number Patient can be reached at: Cell number on file:    Telephone Information:   Mobile [phone]       Best Time: anytime     Can we leave a detailed message on this number? YES    Call taken on 3/24/2020 at 11:17 AM by Lexy Castillo

## 2020-04-06 ENCOUNTER — VIRTUAL VISIT (OUTPATIENT)
Dept: FAMILY MEDICINE | Facility: CLINIC | Age: 40
End: 2020-04-06
Payer: COMMERCIAL

## 2020-04-06 DIAGNOSIS — G51.0 BELL'S PALSY: Primary | ICD-10-CM

## 2020-04-06 PROCEDURE — 99213 OFFICE O/P EST LOW 20 MIN: CPT | Mod: TEL | Performed by: FAMILY MEDICINE

## 2020-04-06 RX ORDER — VALACYCLOVIR HYDROCHLORIDE 1 G/1
TABLET, FILM COATED ORAL
COMMUNITY
Start: 2020-03-24 | End: 2020-04-06

## 2020-04-06 RX ORDER — PREDNISONE 20 MG/1
TABLET ORAL
COMMUNITY
Start: 2020-03-24 | End: 2020-04-06

## 2020-04-06 NOTE — LETTER
April 6, 2020      Christopher Mejia  [address] [facility] [address]        To Whom It May Concern:    Christopher Mejia was diagnosed with Bell's Palsy recently. Kindly excuse him from work for 1 week considering the diagnosis and current COVID-19 pandemic.         Let us know if there are any questions.          Sincerely,        Von Cruz MD

## 2020-04-06 NOTE — PROGRESS NOTES
"Subjective     Christopher Mejia is a 40 year old male who is being evaluated via a billable telephone visit.      The patient has been notified of following:     \"This telephone visit will be conducted via a call between you and your physician/provider. We have found that certain health care needs can be provided without the need for a physical exam.  This service lets us provide the care you need with a short phone conversation.  If a prescription is necessary we can send it directly to your pharmacy.  If lab work is needed we can place an order for that and you can then stop by our lab to have the test done at a later time.    If during the course of the call the physician/provider feels a telephone visit is not appropriate, you will not be charged for this service.\"     Patient has given verbal consent for Telephone visit?  Yes    Christopher Mejia complains of   Chief Complaint   Patient presents with     er follow up       ALLERGIES  Amoxicillin    ED/UC Followup:    Facility:  [facility]   Date of visit: 3/24/2020  Reason for visit: Bell's Palsy - numbness on the right side of face and tongue- some facial drop and drooling.   Was put on prednisone and an anti-viral- done with both now.   Current Status: still has pain in the neck and behind the ear. Right half of face is still numb. Can't move his eyes very well. Still droopy.  Doesn't seem to affect vision- eyes will water a lot though.     Wondering if it is safe for him to go to work. 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 If not will need a note for work.  Doesn't have to go back to work until Wednesday.        Patient Active Problem List   Diagnosis     Body mass index 40 and over, adult     CARDIOVASCULAR SCREENING; LDL GOAL LESS THAN 130     ELO (obstructive sleep apnea)     Gastroesophageal reflux disease without esophagitis     Morbid obesity (H)     Tobacco dependence     Benign essential hypertension     Past Surgical History:   Procedure Laterality Date "     APPENDECTOMY  1989       Social History     Tobacco Use     Smoking status: Current Every Day Smoker     Packs/day: 1.00     Types: Cigarettes     Smokeless tobacco: Never Used   Substance Use Topics     Alcohol use: Yes     Frequency: Monthly or less     Drinks per session: 1 or 2     Binge frequency: Never     Family History   Problem Relation Age of Onset     Cancer Mother         thyroid     Respiratory Paternal Grandmother      Cardiovascular Paternal Grandmother      Cancer Paternal Grandfather          Current Outpatient Medications   Medication Sig Dispense Refill     naproxen (NAPROSYN) 500 MG tablet Take 1 tablet (500 mg) by mouth 3 times daily (with meals) 90 tablet 1     pantoprazole (PROTONIX) 20 MG EC tablet TAKE ONE TABLET BY MOUTH 30-60 MINUTES BEFORE A MEAL 90 tablet 0     Allergies   Allergen Reactions     Amoxicillin Hives     Recent Labs   Lab Test 05/13/16   LDL 97   HDL 40   TRIG 91   TSH 2.359      BP Readings from Last 3 Encounters:   12/26/18 136/84   07/24/18 119/65   12/13/17 (!) 160/100    Wt Readings from Last 3 Encounters:   12/26/18 (!) 182 kg (401 lb 3.2 oz)   12/13/17 (!) 182.8 kg (403 lb)   08/08/17 (!) 179.6 kg (396 lb)                    Reviewed and updated as needed this visit by Provider         Review of Systems   ROS COMP: Constitutional, HEENT, cardiovascular, pulmonary, gi and gu systems are negative, except as otherwise noted.       Objective   Reported vitals:  There were no vitals taken for this visit.   alert and no distress  Psych: Alert and oriented times 3; coherent speech, normal   rate and volume, able to articulate logical thoughts, able   to abstract reason, no tangential thoughts, no hallucinations   or delusions. His affect is normaL       Assessment/Plan:  1. Bell's palsy  --Patient was diagnosed with [place] palsy recently, completed prednisone and valtrex. Right sided facial weakness improving gradually, complaining of some right eye irritation. Suggested  to do facial exercises and artificial eye drops. Workability noted provided. All questions answered.         Phone call duration:  12 minutes      Von Cruz MD

## 2020-04-06 NOTE — PATIENT INSTRUCTIONS
Patient Education     [place] s Palsy    Bell's Palsy is a problem involving the nerve that controls the muscles on one side of the face.  In most cases, the cause is unknown, but may be related to inflammation of the nerve, diabetes, pregnancy, Lyme disease, and viral infections such as herpes or varicella. Symptoms usually appear only on one side. They may include:    Inability to close the eyelid    Tearing of the eye    Facial drooping    Drooling    Numbness or pain    Changes in taste    Sound sensitivity  Damage to the eye can be a serious problem. The inability to blink can cause the eye to dry out. An ulcer (sore) can then form on the cornea. Also, not blinking means that the eye has no protection from dirt and dust particles.  Treatment involves protecting and moistening the eye. Medicines, such as steroids, may also help.  Most people recover fully within 3 to 6 months. However, the condition sometimes returns months or years later.  Home care    Get plenty of rest and eat a healthy diet to help yourself recover.    Use artificial tears often during the day and at bedtime to prevent drying. These drops are available without prescription at your drug store.    Wear protective glasses especially when outside to protect from flying debris. Use sunglasses when outdoors.    Tape the eyelid closed at bedtime with a paper tape (available at your pharmacy). It has a very mild adhesive so won't injure the lid. This will protect your eye from injury while you sleep.    Sometimes medicines are prescribed to reduce inflammation or treat specific viral infections of the nerve. If medicines are prescribed, take them exactly as directed. Usually the sooner the medicines are started, the more effective they are. Taking this medicine as prescribed will help with a full recovery.    Use low heat, for example from a heating pad, on the affected area. This can help reduce pain and swelling.    If you have severe pain, contact  your healthcare provider.  Follow-up care  Follow up with your healthcare provider as advised. If you referred to a specialist, make that appointment promptly.  When to seek medical advice  Call your healthcare provider if any of the following occur:    Severe eye redness    Eye pain    Thick drainage from the eye    Change in vision (such as double vision or losing vision)    Fever over 100.4 F (38 C) or as directed by your healthcare provider    Headache, neck pain, weakness, trouble speaking or walking, or other unexplained symptoms  Date Last Reviewed: 3/1/2018    9576-4763 [employer]. [address] [place], [place] [address]. All rights reserved. This information is not intended as a substitute for professional medical care. Always follow your healthcare professional's instructions.           Patient Education     Bell s Palsy  Bell s palsy is a nerve disorder that usually happens suddenly and without warning. This condition happens when a nerve that controls facial movement is swollen, inflamed, or compressed. Nerve damage can happen for many reasons. But most cases of Bell s palsy are probably caused by a virus.  Symptoms of Bell s palsy  Here are signs of the disorder:     Weakness, twitching, or total paralysis of one side of your face (in rare cases, on both sides)    Drooping of the eyelid and mouth, drooling on one side of mouth    Trouble closing one eye completely, and excessive tearing    Noises seeming louder than usual    Ringing in one or both ears    Change in your sense of taste  When to go to the emergency room (ER)  There are conditions, such as stroke, that may look like Bell's palsy and are medical emergencies. Therefore, you should seek emergent medical care if you notice facial weakness or drooping. Although Bell s palsy can be alarming, it s rarely serious. Many people begin to improve in about 2 weeks, even without treatment. It is important to be seen as soon as possible.  Most research shows that treatment is best when received within the first few days of symptoms.   Treatment  To treat Bell s palsy, you may be given steroid medicines. This helps reduce swelling of the affected nerve. In some cases, your healthcare provider may prescribe an antiviral medicine. Your open eye may be covered with a patch to prevent it from drying out. You also may need to use eye drops and ointments for a time. Physical therapy, facial massage, or acupuncture may also be prescribed. Your healthcare provider will discuss follow-up care with you, including the possible need for further treatment to help your facial muscles return to normal.  Date Last Reviewed: 3/1/2018    8418-9252 [employer]. [address] [place] [address], [place] [address]. All rights reserved. This information is not intended as a substitute for professional medical care. Always follow your healthcare professional's instructions.

## 2020-04-10 ENCOUNTER — TELEPHONE (OUTPATIENT)
Dept: FAMILY MEDICINE | Facility: CLINIC | Age: 40
End: 2020-04-10

## 2020-04-10 NOTE — TELEPHONE ENCOUNTER
Blood pressure still above target goal of less than 140/90.  Will recommend to schedule video visit next week for discussing further treatment options.      Von Cruz MD  [facility]

## 2020-04-10 NOTE — TELEPHONE ENCOUNTER
Pt is calling back on this BP readings for the week:  Monday  151/100  Tuesday 161/103  Wednesday 160/90  Thursday 166/87  Friday 172/88    Taken in the     Jo Santos  [facility]

## 2020-04-13 ENCOUNTER — TELEPHONE (OUTPATIENT)
Dept: FAMILY MEDICINE | Facility: CLINIC | Age: 40
End: 2020-04-13

## 2020-04-13 ENCOUNTER — VIRTUAL VISIT (OUTPATIENT)
Dept: FAMILY MEDICINE | Facility: CLINIC | Age: 40
End: 2020-04-13
Payer: COMMERCIAL

## 2020-04-13 DIAGNOSIS — Z86.69 HISTORY OF BELL'S PALSY: ICD-10-CM

## 2020-04-13 DIAGNOSIS — I10 BENIGN ESSENTIAL HYPERTENSION: Primary | ICD-10-CM

## 2020-04-13 PROCEDURE — 99213 OFFICE O/P EST LOW 20 MIN: CPT | Performed by: FAMILY MEDICINE

## 2020-04-13 RX ORDER — LISINOPRIL/HYDROCHLOROTHIAZIDE 10-12.5 MG
1 TABLET ORAL DAILY
Qty: 90 TABLET | Refills: 1 | Status: SHIPPED | OUTPATIENT
Start: 2020-04-13 | End: 2023-12-06

## 2020-04-13 NOTE — LETTER
April 13, 2020      Christopher Mejia  [address] [facility] [address]        To Whom It May Concern:      Christopher SERVIN Jackie had virtual visit this morning.  Patient is known to have hypertension, morbid obesity and history of recent Bell's palsy.        He is obviously high risk considering his pre-existing health conditions.  Kindly excuse his work absence for next 3 weeks due to Covid-19 pandemic.        Sincerely,        Von Cruz MD

## 2020-04-13 NOTE — PROGRESS NOTES
"Christopher Mejia is a 40 year old male who is being evaluated via a billable telephone visit.      The patient has been notified of following:     \"This telephone visit will be conducted via a call between you and your physician/provider. We have found that certain health care needs can be provided without the need for a physical exam.  This service lets us provide the care you need with a short phone conversation.  If a prescription is necessary we can send it directly to your pharmacy.  If lab work is needed we can place an order for that and you can then stop by our lab to have the test done at a later time.    Telephone visits are billed at different rates depending on your insurance coverage. During this emergency period, for some insurers they may be billed the same as an in-person visit.  Please reach out to your insurance provider with any questions.    If during the course of the call the physician/provider feels a telephone visit is not appropriate, you will not be charged for this service.\"    Patient has given verbal consent for Telephone visit?  Yes    How would you like to obtain your AVS? Mail a copy    Subjective     Christopher Mejia is a 40 year old male who presents to clinic today for the following health issues:    Hypertension Follow-up      Do you check your blood pressure regularly outside of the clinic? Yes     Are you following a low salt diet? Yes    Are your blood pressures ever more than 140 on the top number (systolic) OR more   than 90 on the bottom number (diastolic), for example 140/90? Yes   Monday           151/100  Tuesday          161/103  Wednesday     160/90  Thursday         166/87  Friday  172/88      How many servings of fruits and vegetables do you eat daily?  0-1    On average, how many sweetened beverages do you drink each day (Examples: soda, juice, sweet tea, etc.  Do NOT count diet or artificially sweetened beverages)?   3    How many days per week do you exercise " enough to make your heart beat faster? 3 or less    How many minutes a day do you exercise enough to make your heart beat faster? 30 - 60    How many days per week do you miss taking your medication? 0      [place] Palsy recheck       Duration: 3/24/2020     Description (location/character/radiation): face is still numb, cant move face all the way     Intensity:  moderate    Accompanying signs and symptoms: none     History (similar episodes/previous evaluation): None    Precipitating or alleviating factors: None    Therapies tried and outcome: prednisone, and antiviral are done, still doing facial exercises and did not do any of the artifical eye drops        Patient Active Problem List   Diagnosis     Body mass index 40 and over, adult     CARDIOVASCULAR SCREENING; LDL GOAL LESS THAN 130     ELO (obstructive sleep apnea)     Gastroesophageal reflux disease without esophagitis     Morbid obesity (H)     Tobacco dependence     Benign essential hypertension     Past Surgical History:   Procedure Laterality Date     APPENDECTOMY  1989       Social History     Tobacco Use     Smoking status: Current Every Day Smoker     Packs/day: 1.00     Types: Cigarettes     Smokeless tobacco: Never Used   Substance Use Topics     Alcohol use: Yes     Frequency: Monthly or less     Drinks per session: 1 or 2     Binge frequency: Never     Family History   Problem Relation Age of Onset     Cancer Mother         thyroid     Respiratory Paternal Grandmother      Cardiovascular Paternal Grandmother      Cancer Paternal Grandfather          Current Outpatient Medications   Medication Sig Dispense Refill     naproxen (NAPROSYN) 500 MG tablet Take 1 tablet (500 mg) by mouth 3 times daily (with meals) (Patient not taking: Reported on 4/13/2020) 90 tablet 1     pantoprazole (PROTONIX) 20 MG EC tablet TAKE ONE TABLET BY MOUTH 30-60 MINUTES BEFORE A MEAL (Patient not taking: Reported on 4/13/2020) 90 tablet 0     Allergies   Allergen Reactions      Amoxicillin Hives     Recent Labs   Lab Test 05/13/16   LDL 97   HDL 40   TRIG 91   TSH 2.359      BP Readings from Last 3 Encounters:   12/26/18 136/84   07/24/18 119/65   12/13/17 (!) 160/100    Wt Readings from Last 3 Encounters:   12/26/18 (!) 182 kg (401 lb 3.2 oz)   12/13/17 (!) 182.8 kg (403 lb)   08/08/17 (!) 179.6 kg (396 lb)                    Reviewed and updated as needed this visit by Provider         Review of Systems   ROS COMP: Constitutional, HEENT, cardiovascular, pulmonary, GI, , musculoskeletal, neuro, skin, endocrine and psych systems are negative, except as otherwise noted.       Objective   Reported vitals:   alert and no distress  PSYCH: Alert and oriented times 3; coherent speech, normal   rate and volume, able to articulate logical thoughts, able   to abstract reason, no tangential thoughts, no hallucinations   or delusions  His affect is normal  RESP: No cough, no audible wheezing, able to talk in full sentences  Remainder of exam unable to be completed due to telephone visits      Assessment/Plan:  1. Benign essential hypertension  --Blood pressure still above target goal of less than 140/90.  Known to have morbid obesity.  Treatment options discussed.  Zestoretic prescribed, common side effects discussed. Healthy lifestyle modifications stressed including regular exercise, balanced diet, weight loss and limiting salt/caffeine/pop intake.  - lisinopril-hydrochlorothiazide (ZESTORETIC) 10-12.5 MG tablet; Take 1 tablet by mouth daily  Dispense: 90 tablet; Refill: 1  - Basic metabolic panel; Future      2. History of Bell's palsy  -Continue facial exercises        Phone call duration: 12 minutes      Von Cruz MD

## 2020-04-13 NOTE — TELEPHONE ENCOUNTER
Christopher Suresh has called and left this fax number of: [phone] for the letter for today for his work.    Jo Santos  [facility]

## 2020-05-11 ENCOUNTER — ALLIED HEALTH/NURSE VISIT (OUTPATIENT)
Dept: FAMILY MEDICINE | Facility: CLINIC | Age: 40
End: 2020-05-11
Payer: COMMERCIAL

## 2020-05-11 ENCOUNTER — TELEPHONE (OUTPATIENT)
Dept: FAMILY MEDICINE | Facility: CLINIC | Age: 40
End: 2020-05-11

## 2020-05-11 VITALS — HEART RATE: 80 BPM | DIASTOLIC BLOOD PRESSURE: 82 MMHG | SYSTOLIC BLOOD PRESSURE: 118 MMHG | RESPIRATION RATE: 16 BRPM

## 2020-05-11 DIAGNOSIS — I10 BENIGN ESSENTIAL HYPERTENSION: ICD-10-CM

## 2020-05-11 DIAGNOSIS — I10 BENIGN ESSENTIAL HYPERTENSION: Primary | ICD-10-CM

## 2020-05-11 LAB
ANION GAP SERPL CALCULATED.3IONS-SCNC: 3 MMOL/L (ref 3–14)
BUN SERPL-MCNC: 14 MG/DL (ref 7–30)
CALCIUM SERPL-MCNC: 8.6 MG/DL (ref 8.5–10.1)
CHLORIDE SERPL-SCNC: 105 MMOL/L (ref 94–109)
CO2 SERPL-SCNC: 29 MMOL/L (ref 20–32)
CREAT SERPL-MCNC: 1 MG/DL (ref 0.66–1.25)
GFR SERPL CREATININE-BSD FRML MDRD: >90 ML/MIN/{1.73_M2}
GLUCOSE SERPL-MCNC: 87 MG/DL (ref 70–99)
POTASSIUM SERPL-SCNC: 4 MMOL/L (ref 3.4–5.3)
SODIUM SERPL-SCNC: 137 MMOL/L (ref 133–144)

## 2020-05-11 PROCEDURE — 80048 BASIC METABOLIC PNL TOTAL CA: CPT | Performed by: FAMILY MEDICINE

## 2020-05-11 PROCEDURE — 36415 COLL VENOUS BLD VENIPUNCTURE: CPT | Performed by: FAMILY MEDICINE

## 2020-05-11 PROCEDURE — 99207 ZZC NO CHARGE NURSE ONLY: CPT

## 2020-05-11 NOTE — TELEPHONE ENCOUNTER
Christopher Mejia is a 40 year old year old patient who comes in today for a Blood Pressure check because of new medication.  Vital Signs as repeated by /82  Patient is taking medication as prescribed  Patient is tolerating medications well.  Patient is not monitoring Blood Pressure at home.   Current complaints: none, except occasional headache  Disposition:  Continue med for BP.  Await lab.   Advised to check BP at home once a day for a bit to see if low at home.      He is feeling better.  [place] palsy symptoms better.  He has note to return to work on 5/18/20.   Needs new note to return to work.  Or, do you want to see him  Char Flores RN

## 2020-05-11 NOTE — TELEPHONE ENCOUNTER
Would recommend to schedule virtual visit.    Von Cruz MD  [facility]

## 2020-05-11 NOTE — PROGRESS NOTES
Christopher Mejia is a 40 year old year old patient who comes in today for a Blood Pressure check because of new medication.  Vital Signs as repeated by /82  Patient is taking medication as prescribed  Patient is tolerating medications well.  Patient is not monitoring Blood Pressure at home.   Current complaints: none, except occasional headache  Disposition:  Continue med for BP.  Await lab.   Advised to check BP at home.        He is feeling better.  [place] palsy symptoms better.  He has note to return to work on 5/18/20.   Needs new note to return to work.  Or, do you want to see him  Char Flores RN

## 2020-05-13 ENCOUNTER — TELEPHONE (OUTPATIENT)
Dept: FAMILY MEDICINE | Facility: CLINIC | Age: 40
End: 2020-05-13

## 2020-05-13 NOTE — LETTER
May 13, 2020      Christopher Mejia  [address] [facility] [address]        To Whom It May Concern:      Christopher Mejia was seen in our clinic. He may return to work on May 19th without restrictions.      Sincerely,        Von Cruz MD

## 2020-05-13 NOTE — TELEPHONE ENCOUNTER
Reason for Call:  Other     Detailed comments: Christopher says he has done a follow up with Dr Cruz and had labs done. He wants to know if Dr Cruz still wants him to stay home from work. He says he has been out of work for awhile due to his high blood pressure.     Phone Number Patient can be reached at: Home number on file [phone] (home)    Best Time: anytime    Can we leave a detailed message on this number? YES    Call taken on 5/13/2020 at 8:13 AM by Pao Cervantes    BP Readings from Last 3 Encounters:   05/11/20 118/82   12/26/18 136/84   07/24/18 119/65     Pt states it has been under 140/90 at home on monitor   Pt is a  for [employer].

## 2020-05-13 NOTE — TELEPHONE ENCOUNTER
Patient called back. He needs the note to say that he may return back to work on the 19th of May.    Sarina Lucio-[employer] [place]

## 2020-05-13 NOTE — LETTER
May 13, 2020      Christopher Mejia  [address] [address]        To Whom It May Concern:      Christopher Mejia was seen in our clinic. He may return to work without restrictions.        Sincerely,          Von Cruz MD

## 2022-01-18 NOTE — NURSING NOTE
"Chief Complaint   Patient presents with     Physical       Initial /86 (Cuff Size: Adult Large)  Pulse 74  Temp 98.9  F (37.2  C) (Tympanic)  Resp 18  Ht 6' 9\" (2.057 m)  Wt (!) 396 lb (179.6 kg)  BMI 42.44 kg/m2 Estimated body mass index is 42.44 kg/(m^2) as calculated from the following:    Height as of this encounter: 6' 9\" (2.057 m).    Weight as of this encounter: 396 lb (179.6 kg).  Medication Reconciliation: complete    Health Maintenance that is potentially due pending provider review:  NONE    n/a    Is there anyone who you would like to be able to receive your results? Not Applicable  If yes have patient fill out CASPER    " Spoke with patient who states her insurance is denying loop recorder.    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    Informed patient to discuss with Dr. Ureña upon return to office 1/20/022.

## 2023-04-07 ENCOUNTER — OFFICE VISIT (OUTPATIENT)
Dept: URGENT CARE | Facility: URGENT CARE | Age: 43
End: 2023-04-07
Payer: COMMERCIAL

## 2023-04-07 VITALS
SYSTOLIC BLOOD PRESSURE: 155 MMHG | HEART RATE: 75 BPM | TEMPERATURE: 98.2 F | DIASTOLIC BLOOD PRESSURE: 95 MMHG | OXYGEN SATURATION: 97 % | BODY MASS INDEX: 46.32 KG/M2 | WEIGHT: 315 LBS

## 2023-04-07 DIAGNOSIS — M54.50 ACUTE MIDLINE LOW BACK PAIN WITHOUT SCIATICA: Primary | ICD-10-CM

## 2023-04-07 PROCEDURE — 99203 OFFICE O/P NEW LOW 30 MIN: CPT

## 2023-04-07 RX ORDER — CYCLOBENZAPRINE HCL 10 MG
10 TABLET ORAL 3 TIMES DAILY PRN
Qty: 10 TABLET | Refills: 0 | Status: SHIPPED | OUTPATIENT
Start: 2023-04-07 | End: 2023-12-06

## 2023-04-07 RX ORDER — MELOXICAM 15 MG/1
15 TABLET ORAL DAILY
Qty: 10 TABLET | Refills: 0 | Status: SHIPPED | OUTPATIENT
Start: 2023-04-07 | End: 2023-12-06

## 2023-04-07 NOTE — PATIENT INSTRUCTIONS
Diagnosis: Back sprain/strain, disc   Plan:   RICE: rest, ice / heat - alternating, gentle stretching   Muscle relaxants   Ibuprofen and tylenol for pain today (mobic, naproxen) (Celebrex - cox2- GI upset/bleeding)   Can try: creams and rubs- lidocaine, Voltaren   Lidocaine patches   Acute back pain from a sprain or strain usually gets better in 1 to 2 weeks.   Will refer you to spine speciality  for further follow up and treatment regarding your back pain - make apt, if better can cancel, if not keep and try:   Physical therapy, acupuncture, chiropractic, injections,   Ortho: will discuss stronger medications such as narcotics   Monitor for:   Monitor for pain that worsens and does not improve over 2-6 weeks   Numbness and tingling down leg   Loss of function of bowel or bladder   New fevers, chills, sweats   Increased weakness or loss of strength in legs     Back Pain   Back pain is one of the most common problems. The good news is that most people feel better in 1 to 2 weeks, and most of the rest in 1 to 2 months.   Most people can remain active.  People who have pain describe it differently--not everyone is the same.  The pain can be sharp, stabbing, shooting, aching, cramping or burning.  Movement, standing, bending, lifting, sitting, or walking may worsen pain.  It can be limited to one spot or area, or it can be more generalized.  It can spread upwards, to the front, or go down your arms or legs (sciatica).  It can cause muscle spasm.  Most of the time, mechanical problems with the muscles or spine cause the pain.   Mechanical problems are usually caused by an injury to the muscles or ligaments.   Illness can cause back pain, but it's usually not caused by a serious illness.   Mechanical problems include:   Physical activity such as sports, exercise, work, or normal activity  Overexertion, lifting, pushing, pulling incorrectly or too aggressively  Sudden twisting, bending, or stretching from an accident, or  accidental movement  Poor posture  Stretching or moving wrong, without noticing pain at the time  Poor coordination, lack of regular exercise (check with your doctor about this)  Spinal disc disease or arthritis  Stress  The pain can also be related to pregnancy, or illness like appendicitis, bladder or kidney infections, pelvic infections, and many other things.    Other things to try:   When in bed, try to find a position of comfort. A firm mattress is best. Try lying flat on your back with pillows under your knees. You can also try lying on your side with your  knees bent up toward your chest and a pillow between your knees.  Don't sit for long periods, as in a long car ride or during other travel. This puts more stress on the lower back than standing or walking.  You can alternate ice and heat therapy.   Therapeutic massage can help relax the back muscles without stretching them.  Be aware of safe lifting methods and don't lift anything without stretching first.

## 2023-04-07 NOTE — PROGRESS NOTES
URGENT CARE  Assessment & Plan   Assessment:   Christopher Mejia is a 43 year old male who's clinical presentation today is consistent with:   1. Acute midline low back pain without sciatica  - meloxicam (MOBIC) 15 MG tablet; Take 1 tablet (15 mg) by mouth daily  0  - cyclobenzaprine (FLEXERIL) 10 MG tablet; Take 1 tablet (10 mg) by mouth 3 times   - Spine  Referral; Future    No alarm signs or symptoms present   Differential Diagnoses for this patient's CC include    Spondyloarthropathy, ankylosing spondylitis,    Radicular low back pain, lumbosacral radiculopathy,    vertebral fracture, inflammatory process,   cauda equina, spinal stenosis     Plan:  will treat patient at this time symptomatically and supportively, this will include encouraging: using NSAIDs/Tylenol  to help decrease pain and inflammation, resting, applying ice as needed.   Given the patient's presentation today suspect disc involvement and thus will have the patient try: Muscle relaxants. Further encouraged creams and rubs such as lidocaine or Voltaren    We discussed that acute back pain from a strain or sprain usually gets better in 1-2 weeks but back pain from disc disease, arthritis or spinal canal narrowing can last much longer, for this reason I encouraged the patient to  follow-up with their PCP for further evaluation and treatment if no improvement in the next 2-3 weeks. However if symptoms worsen they should follow up immediately; educated them to monitor for worsening symptoms such as: new weakness, numbness or tingling; new fever/chills, loss of strength in legs; or loss of bowel or bladder function.     Patient  is  agreeable to treatment plan and state they will follow-up if symptoms do not improve and/or if symptoms worsen (see patient's AVS 'monitor for' section for specific patient instructions given and discussed regarding what to watch for and when to follow up)    Medications ordered are listed above, please see AVS  "for patient's specific and personalized discharge instructions given     EUGENIA [facility] [place] CARE [place]      ______________________________________________________________________        Subjective  Subjective     HPI: Christopher Mejia  is a 43 year old  male who presents today for evaluation the following concerns:   Patient presents with midline low back pain which started 2 days ago, on 4/5/23   Patient states this is acute.  Patient states he fell on the ice and slipped and fell landing on his back  Patient endorses pain that is not} radiating down his legs    Patient denies any changes of bowel or bladder. Patient denies any numbness or tingling    Patient does endorse he \"threw out his back \" years ago and it feels similar   The patient has tried resting and NSAIDs  to help alleviate the pain.  Patient states helpful was minimal .   The patient relates certain situations tend to aggravate the pain including bending, sitting and standing.    He denies associated abdominal pain, difficulty with bowel movements, difficulty with urination, fever}       Allergies   Allergen Reactions     Amoxicillin Hives     Patient Active Problem List   Diagnosis     Body mass index 40 and over, adult     CARDIOVASCULAR SCREENING; LDL GOAL LESS THAN 130     ELO (obstructive sleep apnea)     Gastroesophageal reflux disease without esophagitis     Morbid obesity (H)     Tobacco dependence     Benign essential hypertension       Review of Systems:  Pertinent review of systems as reflected in HPI, otherwise negative.     Objective  Objective    Physical Exam:  Vitals:    04/07/23 1820   BP: (!) 155/95   Pulse: 75   Temp: 98.2  F (36.8  C)   TempSrc: Tympanic   SpO2: 97%   Weight: (!) 184.2 kg (406 lb)      General:   alert and oriented, no acute distress, nonill-appearing   Vital signs reviewed: afebrile and normotensive     Psy/mental status: cooperative and pleasant   SKIN: Intact  GI/: wnl, " normal   MSK: Motion intact, strength adequate for age, no edema peripheral pulses +2 and equal bilaterally,  Toe walk: Normal.  Heel walk: Normal.  Back:      Inspection: normal posture        Palpation: Tenderness over lumbar paraspinals at L5 midline       ROM:    Extension to full range, sidebending to left and right limited due to pain, rotation full range, forward fold flexion limited due to pain      Straight leg raises:  positive for increasing pain}       Strength: +3/4 bilaterally       Sensation: normal.  Neuro:  motor, reflexes, cerebellar function, gait and coordination are all within normal limits, no numbness noted      I explained my diagnostic considerations and recommendations to the patient, who voiced understanding and agreement with the treatment plan.   All questions were answered.   We discussed potential side effects, risks and benefits of any prescribed or recommended therapies, as well as expectations for response to treatments.  Please see AVS for any patient instructions & handouts given.   Patient was advised to contact the Nurse Care Line, their Primary Care provider, Urgent Care, or the Emergency Department if there are new or worsening symptoms, or call 911 for emergencies.        ______________________________________________________________________        Patient Instructions   Diagnosis: Back sprain/strain, disc   Plan:     RICE: rest, ice / heat - alternating, gentle stretching     Muscle relaxants     Ibuprofen and tylenol for pain today (mobic, naproxen) (Celebrex - cox2- GI upset/bleeding)     Can try: creams and rubs- lidocaine, Voltaren     Lidocaine patches     Acute back pain from a sprain or strain usually gets better in 1 to 2 weeks.     Will refer you to spine speciality  for further follow up and treatment regarding your back pain - make apt, if better can cancel, if not keep and try:     Physical therapy, acupuncture, chiropractic, injections,     Ortho: will discuss  stronger medications such as narcotics   Monitor for:   1. Monitor for pain that worsens and does not improve over 2-6 weeks   2. Numbness and tingling down leg   3. Loss of function of bowel or bladder   4. New fevers, chills, sweats   5. Increased weakness or loss of strength in legs     Back Pain   Back pain is one of the most common problems. The good news is that most people feel better in 1 to 2 weeks, and most of the rest in 1 to 2 months.   Most people can remain active.  People who have pain describe it differently--not everyone is the same.  1. The pain can be sharp, stabbing, shooting, aching, cramping or burning.  2. Movement, standing, bending, lifting, sitting, or walking may worsen pain.  3. It can be limited to one spot or area, or it can be more generalized.  4. It can spread upwards, to the front, or go down your arms or legs (sciatica).  5. It can cause muscle spasm.  Most of the time, mechanical problems with the muscles or spine cause the pain.   Mechanical problems are usually caused by an injury to the muscles or ligaments.   Illness can cause back pain, but it's usually not caused by a serious illness.   Mechanical problems include:     Physical activity such as sports, exercise, work, or normal activity    Overexertion, lifting, pushing, pulling incorrectly or too aggressively    Sudden twisting, bending, or stretching from an accident, or accidental movement    Poor posture    Stretching or moving wrong, without noticing pain at the time    Poor coordination, lack of regular exercise (check with your doctor about this)    Spinal disc disease or arthritis    Stress    The pain can also be related to pregnancy, or illness like appendicitis, bladder or kidney infections, pelvic infections, and many other things.    Other things to try:     When in bed, try to find a position of comfort. A firm mattress is best. Try lying flat on your back with pillows under your knees. You can also try lying  on your side with your  knees bent up toward your chest and a pillow between your knees.    Don't sit for long periods, as in a long car ride or during other travel. This puts more stress on the lower back than standing or walking.    You can alternate ice and heat therapy.     Therapeutic massage can help relax the back muscles without stretching them.    Be aware of safe lifting methods and don't lift anything without stretching first.

## 2023-08-24 NOTE — TELEPHONE ENCOUNTER
RECORDS RECEIVED FROM: Internal    REASON FOR VISIT: Acute midline low back pain without sciatica   Date of Appt: 9/13/23 9:00am    NOTES (FOR ALL VISITS) STATUS DETAILS   OFFICE NOTE from referring provider Internal 4/7/23 Raisa Hawthorne APRN CNP @[facility]     OFFICE NOTE from other specialist Internal 7/5/17, 11/30/16 Von Cruz MD @[facility]    11/4/16 Coco Barone NP @[facility]     MEDICATION LIST Internal    IMAGING  (FOR ALL VISITS)     MRI (HEAD, NECK, SPINE) Internal [facility]  7/6/17 MR Lumbar Spine

## 2023-09-08 ENCOUNTER — TELEPHONE (OUTPATIENT)
Dept: NEUROSURGERY | Facility: CLINIC | Age: 43
End: 2023-09-08

## 2023-09-08 DIAGNOSIS — M54.50 ACUTE LOW BACK PAIN WITHOUT SCIATICA, UNSPECIFIED BACK PAIN LATERALITY: Primary | ICD-10-CM

## 2023-09-13 ENCOUNTER — PRE VISIT (OUTPATIENT)
Dept: NEUROSURGERY | Facility: CLINIC | Age: 43
End: 2023-09-13

## 2023-09-13 ENCOUNTER — LAB (OUTPATIENT)
Dept: LAB | Facility: CLINIC | Age: 43
End: 2023-09-13
Payer: COMMERCIAL

## 2023-09-13 ENCOUNTER — OFFICE VISIT (OUTPATIENT)
Dept: NEUROSURGERY | Facility: CLINIC | Age: 43
End: 2023-09-13
Payer: COMMERCIAL

## 2023-09-13 ENCOUNTER — ANCILLARY PROCEDURE (OUTPATIENT)
Dept: GENERAL RADIOLOGY | Facility: CLINIC | Age: 43
End: 2023-09-13
Attending: PHYSICIAN ASSISTANT
Payer: COMMERCIAL

## 2023-09-13 VITALS
SYSTOLIC BLOOD PRESSURE: 128 MMHG | OXYGEN SATURATION: 98 % | BODY MASS INDEX: 44.27 KG/M2 | DIASTOLIC BLOOD PRESSURE: 86 MMHG | RESPIRATION RATE: 15 BRPM | HEART RATE: 73 BPM | WEIGHT: 315 LBS

## 2023-09-13 DIAGNOSIS — E55.9 VITAMIN D DEFICIENCY: Primary | ICD-10-CM

## 2023-09-13 DIAGNOSIS — M54.50 ACUTE LOW BACK PAIN WITHOUT SCIATICA, UNSPECIFIED BACK PAIN LATERALITY: ICD-10-CM

## 2023-09-13 DIAGNOSIS — F17.210 CIGARETTE NICOTINE DEPENDENCE WITHOUT COMPLICATION: ICD-10-CM

## 2023-09-13 DIAGNOSIS — S32.010A CLOSED COMPRESSION FRACTURE OF BODY OF L1 VERTEBRA (H): ICD-10-CM

## 2023-09-13 DIAGNOSIS — M54.50 ACUTE MIDLINE LOW BACK PAIN WITHOUT SCIATICA: ICD-10-CM

## 2023-09-13 DIAGNOSIS — E66.9 OBESITY, UNSPECIFIED CLASSIFICATION, UNSPECIFIED OBESITY TYPE, UNSPECIFIED WHETHER SERIOUS COMORBIDITY PRESENT: ICD-10-CM

## 2023-09-13 DIAGNOSIS — E55.9 VITAMIN D DEFICIENCY: ICD-10-CM

## 2023-09-13 PROCEDURE — 72082 X-RAY EXAM ENTIRE SPI 2/3 VW: CPT | Performed by: STUDENT IN AN ORGANIZED HEALTH CARE EDUCATION/TRAINING PROGRAM

## 2023-09-13 PROCEDURE — 77073 BONE LENGTH STUDIES: CPT | Performed by: STUDENT IN AN ORGANIZED HEALTH CARE EDUCATION/TRAINING PROGRAM

## 2023-09-13 PROCEDURE — 36415 COLL VENOUS BLD VENIPUNCTURE: CPT | Performed by: PATHOLOGY

## 2023-09-13 PROCEDURE — 99000 SPECIMEN HANDLING OFFICE-LAB: CPT | Performed by: PATHOLOGY

## 2023-09-13 PROCEDURE — 99204 OFFICE O/P NEW MOD 45 MIN: CPT | Performed by: PHYSICIAN ASSISTANT

## 2023-09-13 PROCEDURE — 82306 VITAMIN D 25 HYDROXY: CPT | Performed by: PHYSICIAN ASSISTANT

## 2023-09-13 RX ORDER — ACETAMINOPHEN 325 MG/1
325-650 TABLET ORAL EVERY 6 HOURS PRN
COMMUNITY

## 2023-09-13 NOTE — LETTER
9/13/2023       RE: Christopher Mejia  [address] [address]     Dear Colleague,    Thank you for referring your patient, Christopher Mejia, to the [facility] NEUROSURGERY [facility] [place] [facility]. Please see a copy of my visit note below.        Neurosurgery Clinic Note    Chief Complaint: back pain    History of Present Illness:  It was a pleasure to evaluate Christopher Mejia in clinic today at the kind referral of   EUGENIA Castle CNP  [address] [place] [address].    Christopher Mejia is a 43 year old male with a PMH of BMI >40, tobacco dependency, vitamin D deficiency, ELO who is presenting for evaluation of low back pain.    Patient had a fall in April 2023 on ice and landed on his buttocks.  He had immediate back pain and presented to  a couple days later.  He was provided with a muscle relaxer and mobic prescriptions and told to follow up with spine if his symptoms did not improve.  No imaging was obtained.  He is here today because although his back pain has improved, he continues to have muscle cramping in his low back.  He can sit w/o issues, but standing 1-2 minutes causes his back muscles to cramp.  He denies b/b or leg radic or parethesethias.  He denies any other recent trauma or injury.  He denies prior history of back fractures or surgeries.      Patient has been able to lose 100 pounds previously on Keto diet.  He is willing to work on losing weight with a dietician at his work.      He smokes 1 pack per day.  He is receptive to work on cutting down.      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      Conservative Treatment:  Mobic - UC med-  not taking anymore, but helpful at the time  Flexeril - UC med - not taking anymore, but was helpful at the time  Aleve, Tylenol - helpful    Review of Systems     See HPI    Past Medical History:   Diagnosis Date    Dyspepsia        Past Surgical History:   Procedure  Laterality Date    APPENDECTOMY  1989       Social History     Socioeconomic History    Marital status:    Tobacco Use    Smoking status: Every Day     Packs/day: 1.00     Types: Cigarettes    Smokeless tobacco: Never   Substance and Sexual Activity    Alcohol use: Yes    Drug use: No    Sexual activity: Yes     Partners: Female     Birth control/protection: Condom   Other Topics Concern    Parent/sibling w/ CABG, MI or angioplasty before 65F 55M? No       family history includes Cancer in his mother and paternal grandfather; Cardiovascular in his paternal grandmother; Respiratory in his paternal grandmother.       IMAGING   Imaging independently reviewed.    EOS XR 9/13/23 - L1 compression fracture w/ focal kyphosis (new since MRI 2017)        25 OH Vit D2   Date Value Ref Range Status   03/30/2010 <5 ug/L Final     25 OH Vit D3   Date Value Ref Range Status   03/30/2010 9 ug/L Final     25 OH Vit D total   Date Value Ref Range Status   03/30/2010 (L) 30 - 75 ug/L Final    <14  Season, race, dietary intake, and treatment affect the concentration of   25-hydroxy-Vitamin D. Values may decrease during winter months and increase   during summer months. Values less than 30 ug/L may indicate Vitamin D   deficiency.        Nicotine Usage:  Yes     Physical Exam   /86   Pulse 73   Resp 15   Wt (!) 176 kg (388 lb)   SpO2 98%   BMI 44.27 kg/m      Constitutional: Appears well-developed and well-nourished. Cooperative. No acute distress.   HENT:   Head: Normocephalic and atraumatic.   Eyes: Conjunctivae are normal.  Neck: Neck supple. No tracheal deviation present.  Cardiovascular: Normal rate and regular rhythm.    Pulmonary/Chest: Effort normal and breath sounds normal.  Abdominal: Exhibits no obvious distension.   Musculoskeletal: Able to move all extremities.  No involuntary motor movements. No C/T/L spine tenderness to palpation.    Cervical flexion-extension range of motion: FROM w/o pain  Lumbar  flexion/extension range of motion: FROM - notes mild low back pain w/ F/E  Skin: Skin is warm, dry and intact.   Psychiatric: Normal mood and affect. Speech is normal and behavior is normal.    Neurological:  Alert, NAD, and oriented to person, place, and time.   No cranial nerve deficit   Gait: steady, symmetrical    Strength (L/R)  5/5 Deltoid  5/5 Bicep  5/5 Tricep  5/5 Handgrip    5/5 Hip Flexion  5/5 Knee Extension  5/5 Ankle Dorsiflexion  5/5 Extensor Hallucis Longus  5/5 Plantar Flexion    Reflexes (L/R)  2+/2+ Bicep  2+/2+ Brachioradialis  2+/2+ Patellar  2+/2+ Ankle    No Babinski  No Lhermitte's  No Spurling's  +/+ Hoda's   No ankle clonus    Sensation: SILT       ASSESSMENT:  Christopher Mejia is a 43 year old male with a PMH of BMI >40, tobacco dependency, vitamin D deficiency, ELO who is presenting for evaluation of low back pain.    EOS XR today reveals an L1 compression fracture with focal kyphosis which was not present on MRI in 2017.  The MRI in 2017 does show bilateral L5 pars defects.      Patient's current muscle cramping/spasms are likely do to the kyphosis he has in his back as a result of the L1 fracture.  The fracture presumable occurred when he fell in April 2023.  I would not recommend a brace at this point in time.  He is not a candidate for elective spinal surgery because of his weight and he smokes.  He may benefit from PT.  May consider vertebroplasty, but again his weight might be an issue and this would not correct the kyphosis.      PLAN:    PT referral to work on back strengthening.      CT lumbar to check fracture and opportunistic bone density.  Vitamin D lab.  I will provide him with this information once it results.      Referral to bone health.      Patient to work on weight loss with his dietician at work.    Smoking Cessation - Call it Quits information provided.    Follow up in 6 weeks to check status.      I spent 49 minutes spent in patient care, independent review  and interpretation of medical records/imaging, reviewing old records.        Again, thank you for allowing me to participate in the care of your patient.      Sincerely,    Judi Fraser PA-C

## 2023-09-13 NOTE — PATIENT INSTRUCTIONS
CT lumbar ordered to better evaluate the L1 compression fracture.    Referral to PT to work on back strengthening.    Vitamin D lab.    Smoking cessation.      Work on weight loss.      Follow up in 6 weeks.

## 2023-09-13 NOTE — PROGRESS NOTES
Neurosurgery Clinic Note    Chief Complaint: back pain    History of Present Illness:  It was a pleasure to evaluate Christopher Mejia in clinic today at the kind referral of   EUGENIA Castle CNP  [address] [place] [address].    Christopher Mejia is a 43 year old male with a PMH of BMI >40, tobacco dependency, vitamin D deficiency, ELO who is presenting for evaluation of low back pain.    Patient had a fall in April 2023 on ice and landed on his buttocks.  He had immediate back pain and presented to  a couple days later.  He was provided with a muscle relaxer and mobic prescriptions and told to follow up with spine if his symptoms did not improve.  No imaging was obtained.  He is here today because although his back pain has improved, he continues to have muscle cramping in his low back.  He can sit w/o issues, but standing 1-2 minutes causes his back muscles to cramp.  He denies b/b or leg radic or parethesethias.  He denies any other recent trauma or injury.  He denies prior history of back fractures or surgeries.      Patient has been able to lose 100 pounds previously on Keto diet.  He is willing to work on losing weight with a dietician at his work.      He smokes 1 pack per day.  He is receptive to work on cutting down.      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      Conservative Treatment:  Mobic - UC med-  not taking anymore, but helpful at the time  Flexeril - UC med - not taking anymore, but was helpful at the time  Aleve, Tylenol - helpful    Review of Systems     See HPI    Past Medical History:   Diagnosis Date    Dyspepsia        Past Surgical History:   Procedure Laterality Date    APPENDECTOMY  1989       Social History     Socioeconomic History    Marital status:    Tobacco Use    Smoking status: Every Day     Packs/day: 1.00     Types: Cigarettes    Smokeless tobacco: Never   Substance and Sexual Activity    Alcohol use: Yes    Drug use: No    Sexual activity: Yes      Partners: Female     Birth control/protection: Condom   Other Topics Concern    Parent/sibling w/ CABG, MI or angioplasty before 65F 55M? No       family history includes Cancer in his mother and paternal grandfather; Cardiovascular in his paternal grandmother; Respiratory in his paternal grandmother.       IMAGING   Imaging independently reviewed.    EOS XR 9/13/23 - L1 compression fracture w/ focal kyphosis (new since MRI 2017)        25 OH Vit D2   Date Value Ref Range Status   03/30/2010 <5 ug/L Final     25 OH Vit D3   Date Value Ref Range Status   03/30/2010 9 ug/L Final     25 OH Vit D total   Date Value Ref Range Status   03/30/2010 (L) 30 - 75 ug/L Final    <14  Season, race, dietary intake, and treatment affect the concentration of   25-hydroxy-Vitamin D. Values may decrease during winter months and increase   during summer months. Values less than 30 ug/L may indicate Vitamin D   deficiency.        Nicotine Usage:  Yes     Physical Exam   /86   Pulse 73   Resp 15   Wt (!) 176 kg (388 lb)   SpO2 98%   BMI 44.27 kg/m      Constitutional: Appears well-developed and well-nourished. Cooperative. No acute distress.   HENT:   Head: Normocephalic and atraumatic.   Eyes: Conjunctivae are normal.  Neck: Neck supple. No tracheal deviation present.  Cardiovascular: Normal rate and regular rhythm.    Pulmonary/Chest: Effort normal and breath sounds normal.  Abdominal: Exhibits no obvious distension.   Musculoskeletal: Able to move all extremities.  No involuntary motor movements. No C/T/L spine tenderness to palpation.    Cervical flexion-extension range of motion: FROM w/o pain  Lumbar flexion/extension range of motion: FROM - notes mild low back pain w/ F/E  Skin: Skin is warm, dry and intact.   Psychiatric: Normal mood and affect. Speech is normal and behavior is normal.    Neurological:  Alert, NAD, and oriented to person, place, and time.   No cranial nerve deficit   Gait: steady,  symmetrical    Strength (L/R)  5/5 Deltoid  5/5 Bicep  5/5 Tricep  5/5 Handgrip    5/5 Hip Flexion  5/5 Knee Extension  5/5 Ankle Dorsiflexion  5/5 Extensor Hallucis Longus  5/5 Plantar Flexion    Reflexes (L/R)  2+/2+ Bicep  2+/2+ Brachioradialis  2+/2+ Patellar  2+/2+ Ankle    No Babinski  No Lhermitte's  No Spurling's  +/+ Hoda's   No ankle clonus    Sensation: SILT       ASSESSMENT:  Christopher Mejia is a 43 year old male with a PMH of BMI >40, tobacco dependency, vitamin D deficiency, ELO who is presenting for evaluation of low back pain.    EOS XR today reveals an L1 compression fracture with focal kyphosis which was not present on MRI in 2017.  The MRI in 2017 does show bilateral L5 pars defects.      Patient's current muscle cramping/spasms are likely do to the kyphosis he has in his back as a result of the L1 fracture.  The fracture presumable occurred when he fell in April 2023.  I would not recommend a brace at this point in time.  He is not a candidate for elective spinal surgery because of his weight and he smokes.  He may benefit from PT.  May consider vertebroplasty, but again his weight might be an issue and this would not correct the kyphosis.      PLAN:    PT referral to work on back strengthening.      CT lumbar to check fracture and opportunistic bone density.  Vitamin D lab.  I will provide him with this information once it results.      Referral to bone health.      Patient to work on weight loss with his dietician at work.    Smoking Cessation - Call it Quits information provided.    Follow up in 6 weeks to check status.      I spent 49 minutes spent in patient care, independent review and interpretation of medical records/imaging, reviewing old records.      Judi Fraser PA-C  Department of Neurosurgery  Office: [phone]

## 2023-09-20 ENCOUNTER — ANCILLARY PROCEDURE (OUTPATIENT)
Dept: CT IMAGING | Facility: CLINIC | Age: 43
End: 2023-09-20
Attending: PHYSICIAN ASSISTANT
Payer: COMMERCIAL

## 2023-09-20 DIAGNOSIS — S32.010A CLOSED COMPRESSION FRACTURE OF BODY OF L1 VERTEBRA (H): ICD-10-CM

## 2023-09-20 DIAGNOSIS — E55.9 VITAMIN D DEFICIENCY: ICD-10-CM

## 2023-09-20 LAB
DEPRECATED CALCIDIOL+CALCIFEROL SERPL-MC: <29 UG/L (ref 20–75)
VITAMIN D2 SERPL-MCNC: <5 UG/L
VITAMIN D3 SERPL-MCNC: 24 UG/L

## 2023-09-20 PROCEDURE — 72131 CT LUMBAR SPINE W/O DYE: CPT | Mod: GC | Performed by: RADIOLOGY

## 2023-09-27 ENCOUNTER — THERAPY VISIT (OUTPATIENT)
Dept: PHYSICAL THERAPY | Facility: CLINIC | Age: 43
End: 2023-09-27
Attending: PHYSICIAN ASSISTANT
Payer: COMMERCIAL

## 2023-09-27 DIAGNOSIS — M54.50 ACUTE MIDLINE LOW BACK PAIN WITHOUT SCIATICA: ICD-10-CM

## 2023-09-27 DIAGNOSIS — E66.9 OBESITY, UNSPECIFIED CLASSIFICATION, UNSPECIFIED OBESITY TYPE, UNSPECIFIED WHETHER SERIOUS COMORBIDITY PRESENT: ICD-10-CM

## 2023-09-27 DIAGNOSIS — S32.010A CLOSED COMPRESSION FRACTURE OF BODY OF L1 VERTEBRA (H): ICD-10-CM

## 2023-09-27 PROCEDURE — 97110 THERAPEUTIC EXERCISES: CPT | Mod: GP | Performed by: PHYSICAL MEDICINE & REHABILITATION

## 2023-09-27 PROCEDURE — 97161 PT EVAL LOW COMPLEX 20 MIN: CPT | Mod: GP | Performed by: PHYSICAL MEDICINE & REHABILITATION

## 2023-09-27 NOTE — PROGRESS NOTES
PHYSICAL THERAPY EVALUATION  Type of Visit: Evaluation    See electronic medical record for Abuse and Falls Screening details.    Subjective   Pt arrived to PT today for chronic LBP that started in April 2023 after fall on the ice. Pt notes sx have improved some but still present. Shared no radiation of sx into B LE. Pain worse with standing or walking, but does get pain with prolonged sitting and driving over bumps in the road at work  Date of onset: 04/05/23   Relevant medical history per pt report:   high BP, smoking, surgery on hammer toe and appendix    Prior diagnostic imaging/testing results:   see epic  Prior therapy history for the same diagnosis, illness or injury:   has seen PT in the past for LBP, however, this is new injury in 2023    Prior Level of Function  Transfers: Independent  Ambulation: Independent  ADL: Independent    Living Environment  Social support:   family, son and wife  Type of home:   home  Stairs to enter the home:   1 MARKOS  Stairs inside the home:   split level home    Employment:  yes, 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 Notes pain as dull, aching and cramping. Feels pain constantly. Pain worse with walking, lifting, carrying, certain positions and household tasks. Notes pain better with OTC medication.     Objective   LUMBAR SPINE EVALUATION  POSTURE: rounded shoulders, increased thoracic kyphosis  GAIT:   Weightbearing Status: WBAT  Assistive Device(s): None  Gait Deviations: WNL  WEIGHTBEARING ALIGNMENT: WNL  NON-WEIGHTBEARING ALIGNMENT: WNL   ROM: flexion: mid tiba, extension: 100% limited (pain), SB B: superior patella (pain on L with L), rotation: WNL B  STRENGTH: 5/5 global hip, knee and ankle strength B  DERMATOMES: normal dull touch sensation amongst B LE in  dermatomal pattern  NEURAL TENSION: normal  FLEXIBILITY: limited hamstrings B, slight limitation into hip ER B  LUMBAR/HIP Special Tests: neg SLR, cross over SLR, slump, held prone testing per discomfort and limited ROM into ext with standing  PELVIS/SI SPECIAL TESTS: level innominates  PALPATION: tenderness over L1 and L3  SPINAL SEGMENTAL CONCLUSIONS: normal lumbar    Assessment & Plan   CLINICAL IMPRESSIONS  Medical Diagnosis: Acute midline low back pain without sciatica (M54.50), Closed compression fracture of body of L1 vertebra (H) (S32.010A), Obesity, unspecified classification, unspecified obesity type, unspecified whether serious comorbidity present (E66.9)    Treatment Diagnosis: chronic LBP   Impression/Assessment: Patient is a 43 year old male with chronic LBP complaints.  The following significant findings have been identified: Pain, Decreased ROM/flexibility, Decreased joint mobility, Decreased strength, Impaired muscle performance, Decreased activity tolerance, and Impaired posture. These impairments interfere with their ability to perform self care tasks, work tasks, recreational activities, household chores, driving , household mobility, and community mobility as compared to previous level of function.     Clinical Decision Making (Complexity):  Clinical Presentation: Stable/Uncomplicated  Clinical Presentation Rationale: based on medical and personal factors listed in PT evaluation  Clinical Decision Making (Complexity): Low complexity    PLAN OF CARE  Treatment Interventions:  Modalities: Cryotherapy, E-stim, Hot Pack, Mechanical Traction, Ultrasound, TENS  Interventions: Gait Training, Manual Therapy, Neuromuscular Re-education, Therapeutic Activity, Therapeutic Exercise, Self-Care/Home Management    Long Term Goals  PT Goal 1  Goal Identifier: 1  Goal Description: Pt will be able to perform lumbar mobility without increase in sx in order to decrease difficulty with ADLs  Target Date:  10/25/23  PT Goal 2  Goal Identifier: 2  Goal Description: Pt will be able to perform bird dog, indicating improved core strength, in order to decrease difficulty with ADLs  Target Date: 11/08/23  PT Goal 3  Goal Identifier: 3  Goal Description: Pt will be independent with HEP in order to self manage symptoms  Target Date: 11/24/23    Frequency of Treatment: 1x/week  Duration of Treatment: 8 weeks    Recommended Referrals to Other Professionals:  none  Education Assessment:   Learner/Method: Patient;Listening;Reading;Demonstration;Pictures/Video  Education Comments: pt demonstrated and verbalized good understanding    Risks and benefits of evaluation/treatment have been explained.   Patient/Family/caregiver agrees with Plan of Care.     Evaluation Time:  PT Eval, Low Complexity Minutes (23823): 18   Present: Not applicable     Signing Clinician: Neha Barone PT      Please contact me with any questions or concerns.    Thank you for your referral,     Neha Barone PT, DPT  Physical Therapist  [address] [address] [phone]

## 2023-10-04 ENCOUNTER — THERAPY VISIT (OUTPATIENT)
Dept: PHYSICAL THERAPY | Facility: CLINIC | Age: 43
End: 2023-10-04
Attending: PHYSICIAN ASSISTANT
Payer: COMMERCIAL

## 2023-10-04 DIAGNOSIS — S32.010A CLOSED COMPRESSION FRACTURE OF BODY OF L1 VERTEBRA (H): Primary | ICD-10-CM

## 2023-10-04 PROCEDURE — 97110 THERAPEUTIC EXERCISES: CPT | Mod: GP | Performed by: PHYSICAL MEDICINE & REHABILITATION

## 2023-10-11 ENCOUNTER — THERAPY VISIT (OUTPATIENT)
Dept: PHYSICAL THERAPY | Facility: CLINIC | Age: 43
End: 2023-10-11
Attending: PHYSICIAN ASSISTANT
Payer: COMMERCIAL

## 2023-10-11 DIAGNOSIS — S32.010A CLOSED COMPRESSION FRACTURE OF BODY OF L1 VERTEBRA (H): Primary | ICD-10-CM

## 2023-10-11 PROCEDURE — 97110 THERAPEUTIC EXERCISES: CPT | Mod: GP | Performed by: PHYSICAL MEDICINE & REHABILITATION

## 2023-10-18 ENCOUNTER — THERAPY VISIT (OUTPATIENT)
Dept: PHYSICAL THERAPY | Facility: CLINIC | Age: 43
End: 2023-10-18
Attending: PHYSICIAN ASSISTANT
Payer: COMMERCIAL

## 2023-10-18 DIAGNOSIS — S32.010A CLOSED COMPRESSION FRACTURE OF BODY OF L1 VERTEBRA (H): Primary | ICD-10-CM

## 2023-10-18 PROCEDURE — 97110 THERAPEUTIC EXERCISES: CPT | Mod: GP | Performed by: PHYSICAL MEDICINE & REHABILITATION

## 2023-10-18 NOTE — PROGRESS NOTES
"   Discharge Note  10/18/23 0500   Appointment Info   Signing clinician's name / credentials Neha Barone, PT, DPT   Total/Authorized Visits 8   Visits Used 4   Medical Diagnosis Acute midline low back pain without sciatica (M54.50), Closed compression fracture of body of L1 vertebra (H) (S32.010A), Obesity, unspecified classification, unspecified obesity type, unspecified whether serious comorbidity present (E66.9)   PT Tx Diagnosis chronic LBP   Precautions/Limitations L1 compression fx   Other pertinent information HP   Progress Note/Certification   Onset of illness/injury or Date of Surgery 04/05/23   Therapy Frequency 1x/week   Predicted Duration 8 weeks   Progress Note Due Date 11/24/23   GOALS   PT Goals 2;3   PT Goal 1   Goal Identifier 1   Goal Description Pt will be able to perform lumbar mobility without increase in sx in order to decrease difficulty with ADLs   Target Date 10/25/23   Date Met 10/11/23   PT Goal 2   Goal Identifier 2   Goal Description Pt will be able to perform bird dog, indicating improved core strength, in order to decrease difficulty with ADLs   Goal Progress Able to perform over the last week without increase in sx   Target Date 11/08/23   Date Met 10/18/23   PT Goal 3   Goal Identifier 3   Goal Description Pt will be independent with HEP in order to self manage symptoms   Goal Progress I with current HEP   Target Date 11/24/23   Date Met 10/18/23   Subjective Report   Subjective Report Pt reports this last week pain has been \"okay.\" Notes 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 Reports no increase in pain with HEP   Objective Measures   Objective Measures Objective Measure 1;Objective Measure 2;Objective Measure 3   Objective Measure 1   Objective Measure lumbar ROM   Details flexion: 4\" from talocrual jt, extension: no limitation, SB R: tibiofemoral jt line, SB L: tibiofemoral jt line, Rotation R: WNL, " Rotation L: WNL. No pain with AROM.   Objective Measure 2   Objective Measure core strength   Details able to perform bird dog without increase in sx   Treatment Interventions (PT)   Interventions Therapeutic Procedure/Exercise   Therapeutic Procedure/Exercise   Therapeutic Procedures: strength, endurance, ROM, flexibillity minutes (23430) 19   Therapeutic Procedures Ther Proc 2;Ther Proc 3;Ther Proc 6;Ther Proc 5;Ther Proc 4;Ther Proc 7;Ther Proc 8;Ther Proc 9   Ther Proc 1 seated knee ext while on exercise ball   Ther Proc 1 - Details 10x1   Ther Proc 2 seated bird dog on ball   Ther Proc 2 - Details 10x1   Ther Proc 3 lateral crunches on ball against wall   Ther Proc 3 - Details 10x1 B   Ther Proc 4 supine bridges with back on ball   Ther Proc 4 - Details 10x1   Ther Proc 5 supine bridges with LE knee ext   Ther Proc 5 - Details 10x1   Ther Proc 6 reviewed and instruced to cont side steps and seated hamstring stretch   Ther Proc 6 - Details x1   Ther Proc 7 pt edu   Ther Proc 7 - Details pt edu to avoid crunches at this time as it could increase LBP secondary to compression fracture   Skilled Intervention HEP review/instruct for D/C   Patient Response/Progress no increase in pain with exercises   Eval/Assessments   Assessments   (Pt has attended 4 PT sessions and has met 3/3 short term and long term goals. Pt's mobility, core strength and pain have improved since starting therapy. Pt is appropriate for D/C at this time as he has met all goals and is independent with HEP.)   Education   Learner/Method Patient;Listening;Reading;Demonstration;Pictures/Video   Education Comments pt demonstrated and verbalized good understanding   Plan   Home program [id number]   Updates to plan of care D/C from PT   Total Session Time   Timed Code Treatment Minutes 19   Total Treatment Time (sum of timed and untimed services) 19         DISCHARGE  Reason for Discharge: Patient has met all goals.    Equipment Issued:  [email or web address]    Discharge Plan: Patient to continue home program.    Referring Provider:  Judi Fraser      Please contact me with any questions or concerns.    Thank you for your referral,     Neha Barone, PT, DPT  Physical Therapist  [place] [id number] [place] [address] [phone]

## 2023-10-25 ENCOUNTER — LAB (OUTPATIENT)
Dept: LAB | Facility: CLINIC | Age: 43
End: 2023-10-25
Payer: COMMERCIAL

## 2023-10-25 ENCOUNTER — OFFICE VISIT (OUTPATIENT)
Dept: NEUROSURGERY | Facility: CLINIC | Age: 43
End: 2023-10-25
Payer: COMMERCIAL

## 2023-10-25 VITALS
HEART RATE: 95 BPM | DIASTOLIC BLOOD PRESSURE: 97 MMHG | HEIGHT: 78 IN | BODY MASS INDEX: 36.45 KG/M2 | WEIGHT: 315 LBS | SYSTOLIC BLOOD PRESSURE: 135 MMHG | RESPIRATION RATE: 16 BRPM | OXYGEN SATURATION: 97 %

## 2023-10-25 DIAGNOSIS — E55.9 VITAMIN D DEFICIENCY: Primary | ICD-10-CM

## 2023-10-25 DIAGNOSIS — E66.9 OBESITY, UNSPECIFIED CLASSIFICATION, UNSPECIFIED OBESITY TYPE, UNSPECIFIED WHETHER SERIOUS COMORBIDITY PRESENT: ICD-10-CM

## 2023-10-25 DIAGNOSIS — S32.010A CLOSED COMPRESSION FRACTURE OF BODY OF L1 VERTEBRA (H): ICD-10-CM

## 2023-10-25 DIAGNOSIS — F17.210 CIGARETTE NICOTINE DEPENDENCE WITHOUT COMPLICATION: ICD-10-CM

## 2023-10-25 LAB
ANION GAP SERPL CALCULATED.3IONS-SCNC: 8 MMOL/L (ref 7–15)
BASOPHILS # BLD AUTO: 0.1 10E3/UL (ref 0–0.2)
BASOPHILS NFR BLD AUTO: 1 %
BUN SERPL-MCNC: 13.4 MG/DL (ref 6–20)
CALCIUM SERPL-MCNC: 9.6 MG/DL (ref 8.6–10)
CHLORIDE SERPL-SCNC: 107 MMOL/L (ref 98–107)
CREAT SERPL-MCNC: 1.02 MG/DL (ref 0.67–1.17)
DEPRECATED HCO3 PLAS-SCNC: 26 MMOL/L (ref 22–29)
EGFRCR SERPLBLD CKD-EPI 2021: >90 ML/MIN/1.73M2
EOSINOPHIL # BLD AUTO: 0.6 10E3/UL (ref 0–0.7)
EOSINOPHIL NFR BLD AUTO: 7 %
ERYTHROCYTE [DISTWIDTH] IN BLOOD BY AUTOMATED COUNT: 13.9 % (ref 10–15)
GLUCOSE SERPL-MCNC: 75 MG/DL (ref 70–99)
HBA1C MFR BLD: 5.9 %
HCT VFR BLD AUTO: 49 % (ref 40–53)
HGB BLD-MCNC: 16.6 G/DL (ref 13.3–17.7)
IMM GRANULOCYTES # BLD: 0 10E3/UL
IMM GRANULOCYTES NFR BLD: 0 %
LYMPHOCYTES # BLD AUTO: 2.6 10E3/UL (ref 0.8–5.3)
LYMPHOCYTES NFR BLD AUTO: 28 %
MCH RBC QN AUTO: 31 PG (ref 26.5–33)
MCHC RBC AUTO-ENTMCNC: 33.9 G/DL (ref 31.5–36.5)
MCV RBC AUTO: 92 FL (ref 78–100)
MONOCYTES # BLD AUTO: 0.9 10E3/UL (ref 0–1.3)
MONOCYTES NFR BLD AUTO: 10 %
NEUTROPHILS # BLD AUTO: 5.1 10E3/UL (ref 1.6–8.3)
NEUTROPHILS NFR BLD AUTO: 54 %
NRBC # BLD AUTO: 0 10E3/UL
NRBC BLD AUTO-RTO: 0 /100
PLATELET # BLD AUTO: 352 10E3/UL (ref 150–450)
POTASSIUM SERPL-SCNC: 4.4 MMOL/L (ref 3.4–5.3)
RBC # BLD AUTO: 5.35 10E6/UL (ref 4.4–5.9)
SODIUM SERPL-SCNC: 141 MMOL/L (ref 135–145)
TSH SERPL DL<=0.005 MIU/L-ACNC: 1.05 UIU/ML (ref 0.3–4.2)
WBC # BLD AUTO: 9.3 10E3/UL (ref 4–11)

## 2023-10-25 PROCEDURE — 99215 OFFICE O/P EST HI 40 MIN: CPT | Performed by: PHYSICIAN ASSISTANT

## 2023-10-25 PROCEDURE — 36415 COLL VENOUS BLD VENIPUNCTURE: CPT | Performed by: PATHOLOGY

## 2023-10-25 PROCEDURE — 84443 ASSAY THYROID STIM HORMONE: CPT | Performed by: PATHOLOGY

## 2023-10-25 PROCEDURE — 80048 BASIC METABOLIC PNL TOTAL CA: CPT | Performed by: PATHOLOGY

## 2023-10-25 PROCEDURE — 99000 SPECIMEN HANDLING OFFICE-LAB: CPT | Performed by: PATHOLOGY

## 2023-10-25 PROCEDURE — 85025 COMPLETE CBC W/AUTO DIFF WBC: CPT | Performed by: PATHOLOGY

## 2023-10-25 PROCEDURE — 83036 HEMOGLOBIN GLYCOSYLATED A1C: CPT | Performed by: PHYSICIAN ASSISTANT

## 2023-10-25 NOTE — NURSING NOTE
"Chief Complaint   Patient presents with    Follow Up     BP (!) 142/103 (BP Location: Right arm, Patient Position: Sitting, Cuff Size: Adult Large)   Pulse 98   Resp 16   Ht 2.007 m (6' 7\")   Wt (!) 176.9 kg (390 lb)   SpO2 97%   BMI 43.94 kg/m    Second BP down to  135/97    .Melina Darby NRP  "

## 2023-10-25 NOTE — PROGRESS NOTES
Neurosurgery Clinic Note    Chief Complaint: back pain    History of Present Illness:  Christopher Mejia is a 43 year old male with a PMH of BMI >44, tobacco dependency, HTN, vitamin D deficiency, ELO who is being followed for low back pain w/ pathological L1 fracture.    9/13/23 Visit - Patient had a fall in April 2023 on ice and landed on his buttocks.  He had immediate back pain and presented to  a couple days later.  He was provided with a muscle relaxer and mobic prescriptions and told to follow up with spine if his symptoms did not improve.  No imaging was obtained.  He is here today because although his back pain has improved, he continues to have muscle cramping in his low back.  He can sit w/o issues, but standing 1-2 minutes causes his back muscles to cramp.  He denies b/b or leg radic or parethesethias.  He denies any other recent trauma or injury.  He denies prior history of back fractures or surgeries.    Patient has been able to lose 100 pounds previously on Keto diet.  He is willing to work on losing weight with a dietician at his work.    He smokes 1 pack per day.  He is receptive to work on cutting down.    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    Conservative Treatment:  Mobic -  med-  not taking anymore, but helpful at the time  Flexeril -  med - not taking anymore, but was helpful at the time  Aleve, Tylenol - helpful    10/25/23 Visit - Patient has attended 4 PT sessions and is now doing HEP exercises.  He has reduction in his back pain, now about 1-2/10.  He is reminded when he hits a bump in the road that he has a fracture in his back.      He has been cutting down on smoking and is at 5-10 cigarettes per day.      He is made significant changes to his diet, eating more fruits and vegetables instead of sweats and salty foods and is using whole grain bread with sandwiches for work.  He has also stopped eating after 7 p.m. and is doing intermittent fasting and not eating until 8 a.m. the  next day.  He is also working on portion control.    He has notes he has been heavy his whole life and was at his lightest in 2015 at 310 lbs.  He was on the KETO diet for 2 years and was able to lose 100 lbs, but gained the weight back during COVID.  His heaviest weight was 415 lbs.      He is not diagnosed with DM, but has had history of high blood sugars.  His does not recall when his last A1C was.      He denies person history of thyroid cancer, but his mother had thyroid cancer.        BMI Readings from Last 10 Encounters:   10/25/23 43.94 kg/m    09/13/23 44.27 kg/m    04/07/23 46.32 kg/m    12/26/18 45.77 kg/m    12/13/17 43.19 kg/m    08/08/17 42.44 kg/m    07/05/17 42.01 kg/m    11/30/16 41.15 kg/m    11/04/16 41.47 kg/m    03/30/10 40.61 kg/m      Review of Systems     See HPI    Past Medical History:   Diagnosis Date    Dyspepsia        Past Surgical History:   Procedure Laterality Date    APPENDECTOMY  1989       Social History     Socioeconomic History    Marital status:    Tobacco Use    Smoking status: Every Day     Packs/day: 1.00     Types: Cigarettes    Smokeless tobacco: Never   Substance and Sexual Activity    Alcohol use: Yes    Drug use: No    Sexual activity: Yes     Partners: Female     Birth control/protection: Condom   Other Topics Concern    Parent/sibling w/ CABG, MI or angioplasty before 65F 55M? No       family history includes Cancer in his mother and paternal grandfather; Cardiovascular in his paternal grandmother; Respiratory in his paternal grandmother.       IMAGING   Imaging independently reviewed.    No new imaging.    CT lumbar 9/20/23 -  opportunistic bone density 116 HU at L2 - unable to measure at L1 because of sclerosis.     Findings: There are 5 lumbar type vertebrae. Regarding alignment,  there is trace anterolisthesis of L5 on S1. Chronic appearing burst  fracture predominantly involving the anterior two thirds of vertebral  body and extending into posterior  superior endplate at L1 resulting in  approximately 80% of height loss and anterior pulsion of bony  fragments. Trace retropulsion of superior endplate. T12-L1  intervertebral disc appears to bulge into the depressed fragment  superior endplate with vacuum phenomenon. There is mild significant  disc space narrowing at L1-2 and L4-5. Bilateral chronic pars defects  at L5. No definite lesions are noted within the vertebra. Findings on  a level by level basis are as follows:  L1-L2: Disc bulge. Bilateral facet arthropathy.   No spinal canal or  neuroforaminal stenosis.  L2-L3: Bilateral facet arthropathy.  No spinal canal or neuroforaminal  stenosis.  L3-L4: Bilateral facet arthropathy. No spinal canal or neuroforaminal  stenosis.  L4-L5: Mild disc bulge and posterior annular calcification. Bilateral  facet arthropathy. No spinal canal stenosis. Moderate right and  mild-to-moderate left neural foraminal stenosis.  L5-S1: Mild disc bulge with superimposed central disc protrusion.  Bilateral facet arthropathy. No spinal canal stenosis. Bilateral mild  neural foraminal stenosis.  The visualized adjacent paraspinous tissues are grossly within normal  limits. Sigmoid diverticulosis.  Impression:  1. Chronic appearing burst fracture L1 with approximately 80% height  loss. Trace retropulsion without associated spinal canal stenosis.  Note, this is unchanged from prior X-ray but new from MRI 2017.  2. Multilevel lumbar spondylosis. No high-grade neural foraminal or  spinal canal stenosis.    EOS XR 9/13/23 - L1 compression fracture w/ focal kyphosis (new since MRI 2017)        25 OH Vit D2   Date Value Ref Range Status   03/30/2010 <5 ug/L Final     25 OH Vitamin D2   Date Value Ref Range Status   09/13/2023 <5 ug/L Final     25 OH Vit D3   Date Value Ref Range Status   03/30/2010 9 ug/L Final     25 OH Vitamin D3   Date Value Ref Range Status   09/13/2023 24 ug/L Final     25 OH Vit D total   Date Value Ref Range Status  "  03/30/2010 (L) 30 - 75 ug/L Final    <14  Season, race, dietary intake, and treatment affect the concentration of   25-hydroxy-Vitamin D. Values may decrease during winter months and increase   during summer months. Values less than 30 ug/L may indicate Vitamin D   deficiency.     25 OH Vit D Total   Date Value Ref Range Status   09/13/2023 <29 20 - 75 ug/L Final     Comment:     Season, race, dietary intake, and treatment affect the concentration of 25-hydroxy-Vitamin D. Values may decrease during winter months and increase during summer months. Values 20-29 ug/L may indicate Vitamin D insufficiency and values <20 ug/L may indicate Vitamin D deficiency.        Nicotine Usage:  Yes     Physical Exam   BP (!) 135/97   Pulse 95   Resp 16   Ht 2.007 m (6' 7\")   Wt (!) 176.9 kg (390 lb)   SpO2 97%   BMI 43.94 kg/m      No spinal TTP    Neurological:  Alert, NAD, and oriented to person, place, and time.   No cranial nerve deficit   Gait: steady, symmetrical    Strength (L/R)  5/5 Deltoid  5/5 Bicep  5/5 Tricep  5/5 Handgrip    5/5 Hip Flexion  5/5 Knee Extension  5/5 Ankle Dorsiflexion  5/5 Extensor Hallucis Longus  5/5 Plantar Flexion    Reflexes (L/R)  2+/2+ Bicep  2+/2+ Brachioradialis  2+/2+ Patellar  2+/2+ Ankle    No Lhermitte's  No Spurling's  +/+ Hoda's   No ankle clonus    Sensation: SILT       ASSESSMENT:  Christopher Mejia is a 43 year old male with a PMH of BMI >40, tobacco dependency, vitamin D deficiency, ELO who is presenting for evaluation of low back pain.    EOS XR today reveals an L1 compression fracture with focal kyphosis which was not present on MRI in 2017.  The MRI in 2017 does show bilateral L5 pars defects.  CT lumbar 9/2023 - stable chronic L1 burst fracture.     Patient's current muscle cramping/spasms are likely do to the kyphosis he has in his back as a result of the L1 fracture.  The fracture presumable occurred when he fell in April 2023.  I would not recommend a brace at this " point in time.  He is not a candidate for elective spinal surgery because of his weight and he smokes.  He may benefit from PT.  May consider vertebroplasty, but again his weight might be an issue and this would not correct the kyphosis.      PLAN:    Continue with efforts to stop smoking.     Weight loss management -   Will get labs today: CBC, BMP, A1C, TSH  Continue with healthy eating and increasing activity, HEP exercises.  Follow-up appointment in 1-2 weeks to discuss medical weight loss    L1 fracture -   Stable, chronic.    No further imaging needed unless increased back pain    Bone health -   Multi-vitamin  Vitamin D 50 mcg daily  DEXA scan    I spent 47 minutes spent in patient care, independent review and interpretation of medical records/imaging, reviewing old records.      Judi Fraser PA-C  Department of Neurosurgery  Office: [phone]

## 2023-10-25 NOTE — LETTER
10/25/2023       RE: Christopher Mejia  [address] [facility] [address]     Dear Colleague,    Thank you for referring your patient, Christopher Mejia, to the [facility] NEUROSURGERY CLINIC [place] at [facility]. Please see a copy of my visit note below.        Neurosurgery Clinic Note    Chief Complaint: back pain    History of Present Illness:  Christopher Mejia is a 43 year old male with a PMH of BMI >44, tobacco dependency, HTN, vitamin D deficiency, ELO who is being followed for low back pain w/ pathological L1 fracture.    9/13/23 Visit - Patient had a fall in April 2023 on ice and landed on his buttocks.  He had immediate back pain and presented to  a couple days later.  He was provided with a muscle relaxer and mobic prescriptions and told to follow up with spine if his symptoms did not improve.  No imaging was obtained.  He is here today because although his back pain has improved, he continues to have muscle cramping in his low back.  He can sit w/o issues, but standing 1-2 minutes causes his back muscles to cramp.  He denies b/b or leg radic or parethesethias.  He denies any other recent trauma or injury.  He denies prior history of back fractures or surgeries.    Patient has been able to lose 100 pounds previously on Keto diet.  He is willing to work on losing weight with a dietician at his work.    He smokes 1 pack per day.  He is receptive to work on cutting down.    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    Conservative Treatment:  Mobic - UC med-  not taking anymore, but helpful at the time  Flexeril - UC med - not taking anymore, but was helpful at the time  Aleve, Tylenol - helpful    10/25/23 Visit - Patient has attended 4 PT sessions and is now doing HEP exercises.  He has reduction in his back pain, now about 1-2/10.  He is reminded when he hits a bump in the road that he has a fracture in his back.      He has been cutting down on  smoking and is at 5-10 cigarettes per day.      He is made significant changes to his diet, eating more fruits and vegetables instead of sweats and salty foods and is using whole grain bread with sandwiches for work.  He has also stopped eating after 7 p.m. and is doing intermittent fasting and not eating until 8 a.m. the next day.  He is also working on portion control.    He has notes he has been heavy his whole life and was at his lightest in 2015 at 310 lbs.  He was on the KETO diet for 2 years and was able to lose 100 lbs, but gained the weight back during COVID.  His heaviest weight was 415 lbs.      He is not diagnosed with DM, but has had history of high blood sugars.  His does not recall when his last A1C was.      He denies person history of thyroid cancer, but his mother had thyroid cancer.        BMI Readings from Last 10 Encounters:   10/25/23 43.94 kg/m    09/13/23 44.27 kg/m    04/07/23 46.32 kg/m    12/26/18 45.77 kg/m    12/13/17 43.19 kg/m    08/08/17 42.44 kg/m    07/05/17 42.01 kg/m    11/30/16 41.15 kg/m    11/04/16 41.47 kg/m    03/30/10 40.61 kg/m      Review of Systems     See HPI    Past Medical History:   Diagnosis Date    Dyspepsia        Past Surgical History:   Procedure Laterality Date    APPENDECTOMY  1989       Social History     Socioeconomic History    Marital status:    Tobacco Use    Smoking status: Every Day     Packs/day: 1.00     Types: Cigarettes    Smokeless tobacco: Never   Substance and Sexual Activity    Alcohol use: Yes    Drug use: No    Sexual activity: Yes     Partners: Female     Birth control/protection: Condom   Other Topics Concern    Parent/sibling w/ CABG, MI or angioplasty before 65F 55M? No       family history includes Cancer in his mother and paternal grandfather; Cardiovascular in his paternal grandmother; Respiratory in his paternal grandmother.       IMAGING   Imaging independently reviewed.    No new imaging.    CT lumbar 9/20/23 -  opportunistic  bone density 116 HU at L2 - unable to measure at L1 because of sclerosis.     Findings: There are 5 lumbar type vertebrae. Regarding alignment,  there is trace anterolisthesis of L5 on S1. Chronic appearing burst  fracture predominantly involving the anterior two thirds of vertebral  body and extending into posterior superior endplate at L1 resulting in  approximately 80% of height loss and anterior pulsion of bony  fragments. Trace retropulsion of superior endplate. T12-L1  intervertebral disc appears to bulge into the depressed fragment  superior endplate with vacuum phenomenon. There is mild significant  disc space narrowing at L1-2 and L4-5. Bilateral chronic pars defects  at L5. No definite lesions are noted within the vertebra. Findings on  a level by level basis are as follows:  L1-L2: Disc bulge. Bilateral facet arthropathy.   No spinal canal or  neuroforaminal stenosis.  L2-L3: Bilateral facet arthropathy.  No spinal canal or neuroforaminal  stenosis.  L3-L4: Bilateral facet arthropathy. No spinal canal or neuroforaminal  stenosis.  L4-L5: Mild disc bulge and posterior annular calcification. Bilateral  facet arthropathy. No spinal canal stenosis. Moderate right and  mild-to-moderate left neural foraminal stenosis.  L5-S1: Mild disc bulge with superimposed central disc protrusion.  Bilateral facet arthropathy. No spinal canal stenosis. Bilateral mild  neural foraminal stenosis.  The visualized adjacent paraspinous tissues are grossly within normal  limits. Sigmoid diverticulosis.  Impression:  1. Chronic appearing burst fracture L1 with approximately 80% height  loss. Trace retropulsion without associated spinal canal stenosis.  Note, this is unchanged from prior X-ray but new from MRI 2017.  2. Multilevel lumbar spondylosis. No high-grade neural foraminal or  spinal canal stenosis.    EOS XR 9/13/23 - L1 compression fracture w/ focal kyphosis (new since MRI 2017)        25 OH Vit D2   Date Value Ref Range  "Status   03/30/2010 <5 ug/L Final     25 OH Vitamin D2   Date Value Ref Range Status   09/13/2023 <5 ug/L Final     25 OH Vit D3   Date Value Ref Range Status   03/30/2010 9 ug/L Final     25 OH Vitamin D3   Date Value Ref Range Status   09/13/2023 24 ug/L Final     25 OH Vit D total   Date Value Ref Range Status   03/30/2010 (L) 30 - 75 ug/L Final    <14  Season, race, dietary intake, and treatment affect the concentration of   25-hydroxy-Vitamin D. Values may decrease during winter months and increase   during summer months. Values less than 30 ug/L may indicate Vitamin D   deficiency.     25 OH Vit D Total   Date Value Ref Range Status   09/13/2023 <29 20 - 75 ug/L Final     Comment:     Season, race, dietary intake, and treatment affect the concentration of 25-hydroxy-Vitamin D. Values may decrease during winter months and increase during summer months. Values 20-29 ug/L may indicate Vitamin D insufficiency and values <20 ug/L may indicate Vitamin D deficiency.        Nicotine Usage:  Yes     Physical Exam   BP (!) 135/97   Pulse 95   Resp 16   Ht 2.007 m (6' 7\")   Wt (!) 176.9 kg (390 lb)   SpO2 97%   BMI 43.94 kg/m      No spinal TTP    Neurological:  Alert, NAD, and oriented to person, place, and time.   No cranial nerve deficit   Gait: steady, symmetrical    Strength (L/R)  5/5 Deltoid  5/5 Bicep  5/5 Tricep  5/5 Handgrip    5/5 Hip Flexion  5/5 Knee Extension  5/5 Ankle Dorsiflexion  5/5 Extensor Hallucis Longus  5/5 Plantar Flexion    Reflexes (L/R)  2+/2+ Bicep  2+/2+ Brachioradialis  2+/2+ Patellar  2+/2+ Ankle    No Lhermitte's  No Spurling's  +/+ Hoda's   No ankle clonus    Sensation: SILT       ASSESSMENT:  Christopher Mejia is a 43 year old male with a PMH of BMI >40, tobacco dependency, vitamin D deficiency, ELO who is presenting for evaluation of low back pain.    EOS XR today reveals an L1 compression fracture with focal kyphosis which was not present on MRI in 2017.  The MRI in 2017 " does show bilateral L5 pars defects.  CT lumbar 9/2023 - stable chronic L1 burst fracture.     Patient's current muscle cramping/spasms are likely do to the kyphosis he has in his back as a result of the L1 fracture.  The fracture presumable occurred when he fell in April 2023.  I would not recommend a brace at this point in time.  He is not a candidate for elective spinal surgery because of his weight and he smokes.  He may benefit from PT.  May consider vertebroplasty, but again his weight might be an issue and this would not correct the kyphosis.      PLAN:    Continue with efforts to stop smoking.     Weight loss management -   Will get labs today: CBC, BMP, A1C, TSH  Continue with healthy eating and increasing activity, HEP exercises.  Follow-up appointment in 1-2 weeks to discuss medical weight loss    L1 fracture -   Stable, chronic.    No further imaging needed unless increased back pain    Bone health -   Multi-vitamin  Vitamin D 50 mcg daily  DEXA scan    I spent 47 minutes spent in patient care, independent review and interpretation of medical records/imaging, reviewing old records.        Again, thank you for allowing me to participate in the care of your patient.      Sincerely,    Judi Fraser PA-C

## 2023-10-25 NOTE — PATIENT INSTRUCTIONS
Continue doing PT exercises.  Think about other ways you can get exercise--pools, exercise bike, etc.    Labs on way out today.      Schedule video visit with me to discuss weight loss medication in 1-2 weeks.     DEXA scan order placed.  Schedule at your convenience.

## 2023-11-08 ENCOUNTER — OFFICE VISIT (OUTPATIENT)
Dept: NEUROSURGERY | Facility: CLINIC | Age: 43
End: 2023-11-08
Payer: COMMERCIAL

## 2023-11-08 VITALS
BODY MASS INDEX: 36.45 KG/M2 | WEIGHT: 315 LBS | SYSTOLIC BLOOD PRESSURE: 128 MMHG | DIASTOLIC BLOOD PRESSURE: 86 MMHG | OXYGEN SATURATION: 96 % | HEIGHT: 78 IN | RESPIRATION RATE: 16 BRPM | HEART RATE: 73 BPM

## 2023-11-08 DIAGNOSIS — S32.010A CLOSED COMPRESSION FRACTURE OF BODY OF L1 VERTEBRA (H): ICD-10-CM

## 2023-11-08 DIAGNOSIS — E66.9 OBESITY, UNSPECIFIED CLASSIFICATION, UNSPECIFIED OBESITY TYPE, UNSPECIFIED WHETHER SERIOUS COMORBIDITY PRESENT: Primary | ICD-10-CM

## 2023-11-08 PROCEDURE — 99214 OFFICE O/P EST MOD 30 MIN: CPT | Performed by: PHYSICIAN ASSISTANT

## 2023-11-08 NOTE — NURSING NOTE
Chief Complaint   Patient presents with    RECHECK     Here for follow up, confirmed with patient         Sena Guadarrama

## 2023-11-08 NOTE — LETTER
"2023       RE: Christopher Mejia  [address] [facility] [address]     Dear Colleague,    Thank you for referring your patient, Christopher Mejia, to the [facility] NEUROSURGERY CLINIC [place] at [facility]. Please see a copy of my visit note below.    New Medical Weight Management Consult    PATIENT:  Christopher Mejia  MRN:         [id number]  :         1980  ARABELLA:         2023    I had the pleasure of seeing your patient, Christopher Mejia. Full intake/assessment was done to determine barriers to weight loss success and develop a treatment plan. Christopher Mejia is a 43 year old male interested in treatment of medical problems associated with excess weight. He has a height of 6' 7\", a weight of 395 lbs 14.4 oz, and the calculated Body mass index is 44.6 kg/m .    Patient has been taking steps on his own to improve his diet.  He is packing lunches which usually include fruit, salad, cottage cheese.  His downfall is the salty snacks.  He consumes at least 2-3 small bags of chips per day.  He does not normally crave a lot of sweats.  He also drinks at least 3, 20 ounce bottles of diet soda per day.  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  He generally does not eat breakfast (part of intermittent fasting), but does eat 2 regular meals per day and several snacks in between.  His last meal is about 7-8 p.m.  He gets about 5 hours of sleep per day.  Has sleep apnea.      Activity:  Walking    Diets:  Intermittent fasting - current  KETO diet in past for 2 years and lost 100 pounds and gained 90% back during COVID  [place] 20+ years ago    Weight history:  Lightest adult weight was 310 lbs .   Adolescent weight was 250 lbs (was active in sports)  Heaviest in 415 lbs. 2018.      Christopher is a patient with early onset child obesity with significant element of " familial/genetic influence and with current health consequences. He does need aggressive weight loss plan due to prediabeties, poor bone health w/ pathologic back fracture, knee pain, sleep apnea.  Christopher Mejia eats a high carb diet, eats fast/convenience foods once or more per week, uses food as mood management (bored eating) and mostly eats during the evening while he is watching TV.    His problem is complicated by strong craving/reward pathways and poor lifestyle choices.  Doesn't feel hungry when busy working.  Gets cravings on the way home and when he is bored.  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  Tends to eat more when watching TV and when he has down time after dinner.  Feels more hungry after dinner.      His ability to lose weight is impacted by current work life and physical limitations. Walks around when has time at work.  Is doing PT exercises a couple of times per week which were 2/2 his back fracture.  Works 12 hours and is tired after working impacting his motivation to workout after work.      BMI Readings from Last 10 Encounters:   11/08/23 44.60 kg/m    10/25/23 43.94 kg/m    09/13/23 44.27 kg/m    04/07/23 46.32 kg/m    12/26/18 45.77 kg/m    12/13/17 43.19 kg/m    08/08/17 42.44 kg/m    07/05/17 42.01 kg/m    11/30/16 41.15 kg/m    11/04/16 41.47 kg/m      Family history:  Mother had thyroid cancer x2 s/p thyroidectomy.  No personal history of thyroid cancer, MEN2, kidney stones.    Assessment & Plan     Work on weight loss  Regular exercise  Follow up on what wellness benefits are available through work, I.e. gym membership, coaching, etc.  Check with insurance company regarding what benefits are for helping with weight loss. (Jinny Barone LPN helping with this and PA).   Bring therapy bands to work to make it easier to workout during breaks.  Increase walking  Fill out inventory sheets and come up with short-term and long-term goals.     Christopher Mejia is a 43 year old male who  presents to clinic today for consult on weight management.  He has several health issues which make weight loss a priority. He is a good candidate for medical weight management with [facility].  He is motivated and has already started to work on dietary changes and ways he can increase physical activity.      He has the following co-morbidities:  Prediabetes  Spinal fracture  Tobacco dependency  Osteoporosis (as defined by opportunistic bone density measurement via CT of 116 HU and L1 burst fracture. DEXA pending 11/30/23)  Knee pain   Sleep apnea (uses CPAP)  Acid reflux  Vitamin D <29 on low end    MEDICATIONS:   Current Outpatient Medications   Medication Sig Dispense Refill    acetaminophen (TYLENOL) 325 MG tablet Take 325-650 mg by mouth every 6 hours as needed for mild pain      ibuprofen (ADVIL/MOTRIN) 100 MG tablet Take 100 mg by mouth every 4 hours as needed      cyclobenzaprine (FLEXERIL) 10 MG tablet Take 1 tablet (10 mg) by mouth 3 times daily as needed for muscle spasms (Patient not taking: Reported on 11/8/2023) 10 tablet 0    lisinopril-hydrochlorothiazide (ZESTORETIC) 10-12.5 MG tablet Take 1 tablet by mouth daily (Patient not taking: Reported on 11/8/2023) 90 tablet 1    meloxicam (MOBIC) 15 MG tablet Take 1 tablet (15 mg) by mouth daily (Patient not taking: Reported on 11/8/2023) 10 tablet 0    naproxen (NAPROSYN) 500 MG tablet Take 1 tablet (500 mg) by mouth 3 times daily (with meals) (Patient not taking: Reported on 11/8/2023) 90 tablet 1    pantoprazole (PROTONIX) 20 MG EC tablet TAKE ONE TABLET BY MOUTH 30-60 MINUTES BEFORE A MEAL (Patient not taking: Reported on 11/8/2023) 90 tablet 0       ALLERGIES:   Allergies   Allergen Reactions    Amoxicillin Hives       Diagnostic Tests/Labs:    CBC RESULTS: Recent Labs   Lab Test 10/25/23  1105   WBC 9.3   RBC 5.35   HGB 16.6   HCT 49.0   MCV 92   MCH 31.0   MCHC 33.9   RDW 13.9        Last Comprehensive Metabolic Panel:  Lab Results   Component  "Value Date     10/25/2023    POTASSIUM 4.4 10/25/2023    CHLORIDE 107 10/25/2023    CO2 26 10/25/2023    ANIONGAP 8 10/25/2023    GLC 75 10/25/2023    BUN 13.4 10/25/2023    CR 1.02 10/25/2023    GFRESTIMATED >90 10/25/2023    LUZ 9.6 10/25/2023       Lab Results   Component Value Date    A1C 5.9 10/25/2023    A1C 5.5 03/30/2010     TSH   Date Value Ref Range Status   10/25/2023 1.05 0.30 - 4.20 uIU/mL Final   05/13/2016 2.359 0.300 - 5.00 mcU/mL Final     25 OH Vit D2   Date Value Ref Range Status   03/30/2010 <5 ug/L Final     25 OH Vitamin D2   Date Value Ref Range Status   09/13/2023 <5 ug/L Final     25 OH Vit D3   Date Value Ref Range Status   03/30/2010 9 ug/L Final     25 OH Vitamin D3   Date Value Ref Range Status   09/13/2023 24 ug/L Final     25 OH Vit D total   Date Value Ref Range Status   03/30/2010 (L) 30 - 75 ug/L Final    <14  Season, race, dietary intake, and treatment affect the concentration of   25-hydroxy-Vitamin D. Values may decrease during winter months and increase   during summer months. Values less than 30 ug/L may indicate Vitamin D   deficiency.     25 OH Vit D Total   Date Value Ref Range Status   09/13/2023 <29 20 - 75 ug/L Final     Comment:     Season, race, dietary intake, and treatment affect the concentration of 25-hydroxy-Vitamin D. Values may decrease during winter months and increase during summer months. Values 20-29 ug/L may indicate Vitamin D insufficiency and values <20 ug/L may indicate Vitamin D deficiency.         PHYSICAL EXAM:  Objective    /86 (BP Location: Right arm, Patient Position: Sitting, Cuff Size: Adult Regular)   Pulse 73   Resp 16   Ht 2.007 m (6' 7\")   Wt (!) 179.6 kg (395 lb 14.4 oz)   SpO2 96%   BMI 44.60 kg/m      Waist circumference: 61\"  Weight: 395 lbs          Again, thank you for allowing me to participate in the care of your patient.      Sincerely,    Judi Fraser PA-C      "

## 2023-11-08 NOTE — PROGRESS NOTES
"New Medical Weight Management Consult    PATIENT:  Christopher Mejia  MRN:         [id number]  :         1980  ARABELLA:         2023    I had the pleasure of seeing your patient, Christopher Mejia. Full intake/assessment was done to determine barriers to weight loss success and develop a treatment plan. Christopher Mejia is a 43 year old male interested in treatment of medical problems associated with excess weight. He has a height of 6' 7\", a weight of 395 lbs 14.4 oz, and the calculated Body mass index is 44.6 kg/m .    Patient has been taking steps on his own to improve his diet.  He is packing lunches which usually include fruit, salad, cottage cheese.  His downfall is the salty snacks.  He consumes at least 2-3 small bags of chips per day.  He does not normally crave a lot of sweats.  He also drinks at least 3, 20 ounce bottles of diet soda per day.  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  He generally does not eat breakfast (part of intermittent fasting), but does eat 2 regular meals per day and several snacks in between.  His last meal is about 7-8 p.m.  He gets about 5 hours of sleep per day.  Has sleep apnea.      Activity:  Walking    Diets:  Intermittent fasting - current  KETO diet in past for 2 years and lost 100 pounds and gained 90% back during COVID  [place] 20+ years ago    Weight history:  Lightest adult weight was 310 lbs .   Adolescent weight was 250 lbs (was active in sports)  Heaviest in 415 lbs. 2018.      Christopher is a patient with early onset child obesity with significant element of familial/genetic influence and with current health consequences. He does need aggressive weight loss plan due to prediabeties, poor bone health w/ pathologic back fracture, knee pain, sleep apnea.  Christopher Mejia eats a high carb diet, eats fast/convenience foods once or more per week, uses food as mood management (bored " eating) and mostly eats during the evening while he is watching TV.    His problem is complicated by strong craving/reward pathways and poor lifestyle choices.  Doesn't feel hungry when busy working.  Gets cravings on the way home and when he is bored.  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  Tends to eat more when watching TV and when he has down time after dinner.  Feels more hungry after dinner.      His ability to lose weight is impacted by current work life and physical limitations. Walks around when has time at work.  Is doing PT exercises a couple of times per week which were 2/2 his back fracture.  Works 12 hours and is tired after working impacting his motivation to workout after work.      BMI Readings from Last 10 Encounters:   11/08/23 44.60 kg/m    10/25/23 43.94 kg/m    09/13/23 44.27 kg/m    04/07/23 46.32 kg/m    12/26/18 45.77 kg/m    12/13/17 43.19 kg/m    08/08/17 42.44 kg/m    07/05/17 42.01 kg/m    11/30/16 41.15 kg/m    11/04/16 41.47 kg/m      Family history:  Mother had thyroid cancer x2 s/p thyroidectomy.  No personal history of thyroid cancer, MEN2, kidney stones.    Assessment & Plan     Work on weight loss  Regular exercise  Follow up on what wellness benefits are available through work, I.e. gym membership, coaching, etc.  Check with insurance company regarding what benefits are for helping with weight loss. (Jinny Barone LPN helping with this and PA).   Bring therapy bands to work to make it easier to workout during breaks.  Increase walking  Fill out inventory sheets and come up with short-term and long-term goals.     Christopher Mejia is a 43 year old male who presents to clinic today for consult on weight management.  He has several health issues which make weight loss a priority. He is a good candidate for medical weight management with Wegovy.  He is motivated and has already started to work on dietary changes and ways he can increase physical activity.      He has the following  co-morbidities:  Prediabetes  Spinal fracture  Tobacco dependency  Osteoporosis (as defined by opportunistic bone density measurement via CT of 116 HU and L1 burst fracture. DEXA pending 11/30/23)  Knee pain   Sleep apnea (uses CPAP)  Acid reflux  Vitamin D <29 on low end    MEDICATIONS:   Current Outpatient Medications   Medication Sig Dispense Refill     acetaminophen (TYLENOL) 325 MG tablet Take 325-650 mg by mouth every 6 hours as needed for mild pain       ibuprofen (ADVIL/MOTRIN) 100 MG tablet Take 100 mg by mouth every 4 hours as needed       cyclobenzaprine (FLEXERIL) 10 MG tablet Take 1 tablet (10 mg) by mouth 3 times daily as needed for muscle spasms (Patient not taking: Reported on 11/8/2023) 10 tablet 0     lisinopril-hydrochlorothiazide (ZESTORETIC) 10-12.5 MG tablet Take 1 tablet by mouth daily (Patient not taking: Reported on 11/8/2023) 90 tablet 1     meloxicam (MOBIC) 15 MG tablet Take 1 tablet (15 mg) by mouth daily (Patient not taking: Reported on 11/8/2023) 10 tablet 0     naproxen (NAPROSYN) 500 MG tablet Take 1 tablet (500 mg) by mouth 3 times daily (with meals) (Patient not taking: Reported on 11/8/2023) 90 tablet 1     pantoprazole (PROTONIX) 20 MG EC tablet TAKE ONE TABLET BY MOUTH 30-60 MINUTES BEFORE A MEAL (Patient not taking: Reported on 11/8/2023) 90 tablet 0       ALLERGIES:   Allergies   Allergen Reactions     Amoxicillin Hives       Diagnostic Tests/Labs:    CBC RESULTS: Recent Labs   Lab Test 10/25/23  1105   WBC 9.3   RBC 5.35   HGB 16.6   HCT 49.0   MCV 92   MCH 31.0   MCHC 33.9   RDW 13.9        Last Comprehensive Metabolic Panel:  Lab Results   Component Value Date     10/25/2023    POTASSIUM 4.4 10/25/2023    CHLORIDE 107 10/25/2023    CO2 26 10/25/2023    ANIONGAP 8 10/25/2023    GLC 75 10/25/2023    BUN 13.4 10/25/2023    CR 1.02 10/25/2023    GFRESTIMATED >90 10/25/2023    LUZ 9.6 10/25/2023       Lab Results   Component Value Date    A1C 5.9 10/25/2023    A1C  "5.5 03/30/2010     TSH   Date Value Ref Range Status   10/25/2023 1.05 0.30 - 4.20 uIU/mL Final   05/13/2016 2.359 0.300 - 5.00 mcU/mL Final     25 OH Vit D2   Date Value Ref Range Status   03/30/2010 <5 ug/L Final     25 OH Vitamin D2   Date Value Ref Range Status   09/13/2023 <5 ug/L Final     25 OH Vit D3   Date Value Ref Range Status   03/30/2010 9 ug/L Final     25 OH Vitamin D3   Date Value Ref Range Status   09/13/2023 24 ug/L Final     25 OH Vit D total   Date Value Ref Range Status   03/30/2010 (L) 30 - 75 ug/L Final    <14  Season, race, dietary intake, and treatment affect the concentration of   25-hydroxy-Vitamin D. Values may decrease during winter months and increase   during summer months. Values less than 30 ug/L may indicate Vitamin D   deficiency.     25 OH Vit D Total   Date Value Ref Range Status   09/13/2023 <29 20 - 75 ug/L Final     Comment:     Season, race, dietary intake, and treatment affect the concentration of 25-hydroxy-Vitamin D. Values may decrease during winter months and increase during summer months. Values 20-29 ug/L may indicate Vitamin D insufficiency and values <20 ug/L may indicate Vitamin D deficiency.         PHYSICAL EXAM:  Objective    /86 (BP Location: Right arm, Patient Position: Sitting, Cuff Size: Adult Regular)   Pulse 73   Resp 16   Ht 2.007 m (6' 7\")   Wt (!) 179.6 kg (395 lb 14.4 oz)   SpO2 96%   BMI 44.60 kg/m      Waist circumference: 61\"  Weight: 395 lbs    Judi Fraser PA-C  Department of Neurosurgery  Office: [phone]          "

## 2023-11-30 ENCOUNTER — ANCILLARY PROCEDURE (OUTPATIENT)
Dept: BONE DENSITY | Facility: CLINIC | Age: 43
End: 2023-11-30
Attending: PHYSICIAN ASSISTANT
Payer: COMMERCIAL

## 2023-11-30 DIAGNOSIS — S32.010A CLOSED COMPRESSION FRACTURE OF BODY OF L1 VERTEBRA (H): ICD-10-CM

## 2023-11-30 DIAGNOSIS — E55.9 VITAMIN D DEFICIENCY: ICD-10-CM

## 2023-11-30 PROCEDURE — 77081 DXA BONE DENSITY APPENDICULR: CPT | Performed by: INTERNAL MEDICINE

## 2023-12-06 ENCOUNTER — OFFICE VISIT (OUTPATIENT)
Dept: FAMILY MEDICINE | Facility: CLINIC | Age: 43
End: 2023-12-06
Payer: COMMERCIAL

## 2023-12-06 ENCOUNTER — TELEPHONE (OUTPATIENT)
Dept: FAMILY MEDICINE | Facility: CLINIC | Age: 43
End: 2023-12-06

## 2023-12-06 VITALS
DIASTOLIC BLOOD PRESSURE: 100 MMHG | RESPIRATION RATE: 20 BRPM | SYSTOLIC BLOOD PRESSURE: 138 MMHG | OXYGEN SATURATION: 100 % | HEIGHT: 78 IN | HEART RATE: 81 BPM | BODY MASS INDEX: 36.45 KG/M2 | WEIGHT: 315 LBS | TEMPERATURE: 98 F

## 2023-12-06 DIAGNOSIS — Z11.59 NEED FOR HEPATITIS C SCREENING TEST: ICD-10-CM

## 2023-12-06 DIAGNOSIS — Z11.4 SCREENING FOR HIV (HUMAN IMMUNODEFICIENCY VIRUS): ICD-10-CM

## 2023-12-06 DIAGNOSIS — I10 BENIGN ESSENTIAL HYPERTENSION: Primary | ICD-10-CM

## 2023-12-06 DIAGNOSIS — E66.01 MORBID OBESITY (H): ICD-10-CM

## 2023-12-06 DIAGNOSIS — F17.200 TOBACCO DEPENDENCE: ICD-10-CM

## 2023-12-06 DIAGNOSIS — F17.200 NICOTINE DEPENDENCE, UNCOMPLICATED, UNSPECIFIED NICOTINE PRODUCT TYPE: ICD-10-CM

## 2023-12-06 PROCEDURE — 99214 OFFICE O/P EST MOD 30 MIN: CPT | Performed by: FAMILY MEDICINE

## 2023-12-06 RX ORDER — LISINOPRIL/HYDROCHLOROTHIAZIDE 10-12.5 MG
1 TABLET ORAL DAILY
Qty: 90 TABLET | Refills: 1 | Status: SHIPPED | OUTPATIENT
Start: 2023-12-06 | End: 2024-03-19

## 2023-12-06 RX ORDER — LISINOPRIL/HYDROCHLOROTHIAZIDE 10-12.5 MG
1 TABLET ORAL DAILY
Qty: 90 TABLET | Refills: 1 | Status: CANCELLED | OUTPATIENT
Start: 2023-12-06

## 2023-12-06 RX ORDER — SEMAGLUTIDE 0.5 MG/.5ML
0.5 INJECTION, SOLUTION SUBCUTANEOUS WEEKLY
Qty: 2 ML | Refills: 0 | Status: SHIPPED | OUTPATIENT
Start: 2024-01-03 | End: 2024-01-31

## 2023-12-06 RX ORDER — SEMAGLUTIDE 1 MG/.5ML
1 INJECTION, SOLUTION SUBCUTANEOUS WEEKLY
Qty: 6 ML | Refills: 3 | Status: SHIPPED | OUTPATIENT
Start: 2024-02-04 | End: 2024-03-19

## 2023-12-06 RX ORDER — SEMAGLUTIDE 0.25 MG/.5ML
0.25 INJECTION, SOLUTION SUBCUTANEOUS WEEKLY
Qty: 2 ML | Refills: 0 | Status: SHIPPED | OUTPATIENT
Start: 2023-12-06 | End: 2024-01-03

## 2023-12-06 ASSESSMENT — PAIN SCALES - GENERAL: PAINLEVEL: NO PAIN (0)

## 2023-12-06 NOTE — TELEPHONE ENCOUNTER
Prior Authorization Retail Medication Request    Medication/Dose: Wegovy 0.25MG/0.5ML auto-injectors  Diagnosis and ICD code (if different than what is on RX):    New/renewal/insurance change PA/secondary ins. PA:  Previously Tried and Failed:    Rationale:      Covermymeds  Key: [id number]    Pharmacy Information (if different than what is on RX)  Name:  [facility]  Phone:  [phone]  Fax: [phone]

## 2023-12-06 NOTE — PROGRESS NOTES
"  Assessment & Plan     Benign essential hypertension  Blood pressure above target goal of less than 140/90.  Patient has not been taking Zestoretic for the last 6 months or so, better compliance stressed.  Zestoretic refilled.  Stressed on regular exercise, healthy diet and weight loss.  Follow-up with RN in 2 weeks for repeat blood pressure check and labs  - lisinopril-hydrochlorothiazide (ZESTORETIC) 10-12.5 MG tablet; Take 1 tablet by mouth daily  - **Basic metabolic panel FUTURE 14d; Future    Screening for HIV (human immunodeficiency virus)  Need for hepatitis C screening test  Patient declined hepatitis C and HIV screening    Tobacco dependence  Nicotine dependence, uncomplicated, unspecified nicotine product type  Smoking cessation counseling provided, associated health hazards explained in detail, not ready to quit currently.    Morbid obesity (H)  Wegovy prescribed, common side effects discussed.  Stressed on regular exercise, healthy diet and weight loss.  Follow-up in 3 months or earlier if needed  - Semaglutide-Weight Management (WEGOVY) 0.25 MG/0.5ML pen; Inject 0.25 mg Subcutaneous once a week for 28 days  - Semaglutide-Weight Management (WEGOVY) 0.5 MG/0.5ML pen; Inject 0.5 mg Subcutaneous once a week for 28 days  - Semaglutide-Weight Management (WEGOVY) 1 MG/0.5ML pen; Inject 1 mg Subcutaneous once a week  - insulin pen needle (32G X 4 MM) 32G X 4 MM miscellaneous; Use 1 pen needles daily or as directed.      Nicotine/Tobacco Cessation:  He reports that he has been smoking cigarettes. He has been smoking an average of 1 pack per day. He has never used smokeless tobacco.  Nicotine/Tobacco Cessation Plan:   Information offered: Patient not interested at this time      BMI:   Estimated body mass index is 44.27 kg/m  as calculated from the following:    Height as of this encounter: 2.007 m (6' 7\").    Weight as of this encounter: 178.3 kg (393 lb).   Weight management plan: Discussed healthy diet and " "exercise guidelines      Von Cruz MD  [facility]    Bridgette Roman is a 43 year old, presenting for the following health issues:  Hypertension    History of Present Illness       Hypertension: He presents for follow up of hypertension.  He does not check blood pressure  regularly outside of the clinic. Outside blood pressures have been over 140/90. He does not follow a low salt diet.     He eats 0-1 servings of fruits and vegetables daily.He consumes 3 sweetened beverage(s) daily.He exercises with enough effort to increase his heart rate 9 or less minutes per day.  He exercises with enough effort to increase his heart rate 3 or less days per week.   He is taking medications regularly.       Review of Systems   Constitutional, HEENT, cardiovascular, pulmonary, GI, , musculoskeletal, neuro, skin, endocrine and psych systems are negative, except as otherwise noted.      Objective    BP (!) 138/100 (BP Location: Right arm)   Pulse 81   Temp 98  F (36.7  C) (Tympanic)   Resp 20   Ht 2.007 m (6' 7\")   Wt (!) 178.3 kg (393 lb)   SpO2 100%   BMI 44.27 kg/m    Body mass index is 44.27 kg/m .  Physical Exam   GENERAL: alert, no distress, and obese  EYES: Eyes grossly normal to inspection, PERRL and conjunctivae and sclerae normal  HENT: normal cephalic/atraumatic, nose and mouth without ulcers or lesions, oropharynx clear, and oral mucous membranes moist  NECK: no adenopathy, no asymmetry, masses, or scars and thyroid normal to palpation  RESP: lungs clear to auscultation - no rales, rhonchi or wheezes  CV: regular rates and rhythm, normal S1 S2, no S3 or S4, and no murmur, click or rub  MS: no gross musculoskeletal defects noted, no edema  SKIN: no suspicious lesions or rashes  NEURO: Normal strength and tone, mentation intact and speech normal  PSYCH: mentation appears normal, affect normal/bright                "

## 2023-12-09 NOTE — TELEPHONE ENCOUNTER
Central Prior Authorization Team   Phone: [phone]    PA Initiation    Medication: WEGOVY 0.25 MG/0.5ML SC SOAJ  Insurance Company: [employer] - Phone [phone] Fax [phone]  Pharmacy Filling the Rx: [id number] - [place], [place] - [address] [place] [address]  Filling Pharmacy Phone: [phone]  Filling Pharmacy Fax:    Start Date: 12/9/2023

## 2023-12-09 NOTE — TELEPHONE ENCOUNTER
Prior Authorization Approval    Authorization Effective Date: 11/9/2023  Authorization Expiration Date: 12/8/2024  Medication: Wegovy-APPROVED  Reference #:     Insurance Company: [employer] - Phone [phone] Fax [phone]  Which Pharmacy is filling the prescription (Not needed for infusion/clinic administered): [id number] - [place], [place] - [address]  Pharmacy Notified: Yes  Patient Notified: Instructed pharmacy to notify patient when script is ready to /ship.

## 2023-12-20 ENCOUNTER — TELEPHONE (OUTPATIENT)
Dept: FAMILY MEDICINE | Facility: CLINIC | Age: 43
End: 2023-12-20

## 2023-12-20 ENCOUNTER — LAB (OUTPATIENT)
Dept: LAB | Facility: CLINIC | Age: 43
End: 2023-12-20
Payer: COMMERCIAL

## 2023-12-20 ENCOUNTER — ALLIED HEALTH/NURSE VISIT (OUTPATIENT)
Dept: FAMILY MEDICINE | Facility: CLINIC | Age: 43
End: 2023-12-20
Payer: COMMERCIAL

## 2023-12-20 VITALS — HEART RATE: 80 BPM | DIASTOLIC BLOOD PRESSURE: 96 MMHG | RESPIRATION RATE: 16 BRPM | SYSTOLIC BLOOD PRESSURE: 134 MMHG

## 2023-12-20 DIAGNOSIS — Z11.4 SCREENING FOR HIV (HUMAN IMMUNODEFICIENCY VIRUS): Primary | ICD-10-CM

## 2023-12-20 DIAGNOSIS — I10 BENIGN ESSENTIAL HYPERTENSION: ICD-10-CM

## 2023-12-20 DIAGNOSIS — Z11.59 NEED FOR HEPATITIS C SCREENING TEST: ICD-10-CM

## 2023-12-20 DIAGNOSIS — I10 BENIGN ESSENTIAL HYPERTENSION: Primary | ICD-10-CM

## 2023-12-20 LAB
ANION GAP SERPL CALCULATED.3IONS-SCNC: 8 MMOL/L (ref 7–15)
BUN SERPL-MCNC: 16.6 MG/DL (ref 6–20)
CALCIUM SERPL-MCNC: 9.3 MG/DL (ref 8.6–10)
CHLORIDE SERPL-SCNC: 107 MMOL/L (ref 98–107)
CHOLEST SERPL-MCNC: 179 MG/DL
CREAT SERPL-MCNC: 1 MG/DL (ref 0.67–1.17)
DEPRECATED HCO3 PLAS-SCNC: 24 MMOL/L (ref 22–29)
EGFRCR SERPLBLD CKD-EPI 2021: >90 ML/MIN/1.73M2
FASTING STATUS PATIENT QL REPORTED: NO
GLUCOSE SERPL-MCNC: 107 MG/DL (ref 70–99)
HCV AB SERPL QL IA: NONREACTIVE
HDLC SERPL-MCNC: 46 MG/DL
HIV 1+2 AB+HIV1 P24 AG SERPL QL IA: NONREACTIVE
LDLC SERPL CALC-MCNC: 119 MG/DL
NONHDLC SERPL-MCNC: 133 MG/DL
POTASSIUM SERPL-SCNC: 4.5 MMOL/L (ref 3.4–5.3)
SODIUM SERPL-SCNC: 139 MMOL/L (ref 135–145)
TRIGL SERPL-MCNC: 70 MG/DL

## 2023-12-20 PROCEDURE — 99207 PR NO CHARGE NURSE ONLY: CPT

## 2023-12-20 PROCEDURE — 80061 LIPID PANEL: CPT

## 2023-12-20 PROCEDURE — 87389 HIV-1 AG W/HIV-1&-2 AB AG IA: CPT

## 2023-12-20 PROCEDURE — 86803 HEPATITIS C AB TEST: CPT

## 2023-12-20 PROCEDURE — 36415 COLL VENOUS BLD VENIPUNCTURE: CPT

## 2023-12-20 PROCEDURE — 80048 BASIC METABOLIC PNL TOTAL CA: CPT

## 2023-12-20 NOTE — TELEPHONE ENCOUNTER
Please recommend patient to follow-up in 3 months.     Dr Cruz     Left detailed message oh his answering machine regarding Dr Cruz's message.  Char Flores RN

## 2023-12-20 NOTE — LETTER
December 21, 2023      Abel Mejia  [address] [address]        Dear ,    We are writing to inform you of your test results.    Lab results consistent with elevated cholesterol levels otherwise unremarkable.     Will recommend to continue regular exercise, the diet, weight loss and current medications.     Resulted Orders   **Basic metabolic panel FUTURE 14d   Result Value Ref Range    Sodium 139 135 - 145 mmol/L      Comment:      Reference intervals for this test were updated on 09/26/2023 to more accurately reflect our healthy population. There may be differences in the flagging of prior results with similar values performed with this method. Interpretation of those prior results can be made in the context of the updated reference intervals.     Potassium 4.5 3.4 - 5.3 mmol/L    Chloride 107 98 - 107 mmol/L    Carbon Dioxide (CO2) 24 22 - 29 mmol/L    Anion Gap 8 7 - 15 mmol/L    Urea Nitrogen 16.6 6.0 - 20.0 mg/dL    Creatinine 1.00 0.67 - 1.17 mg/dL    GFR Estimate >90 >60 mL/min/1.73m2    Calcium 9.3 8.6 - 10.0 mg/dL    Glucose 107 (H) 70 - 99 mg/dL   HIV Antigen Antibody Combo   Result Value Ref Range    HIV Antigen Antibody Combo Nonreactive Nonreactive      Comment:      Negative HIV-1/-2 antigen and antibody screening test results usually indicate the absence of HIV-1 and HIV-2 infection. However, such negative results do not rule-out acute HIV infection.  If acute HIV-1 or HIV-2 infection is suspected, detection of HIV-1 or HIV-2 RNA  is recommended.    Hepatitis C Screen Reflex to HCV RNA Quant and Genotype   Result Value Ref Range    Hepatitis C Antibody Nonreactive Nonreactive      Comment:      A nonreactive screening test result does not exclude the possibility of exposure to or infection with HCV. Nonreactive screening test results in individuals with prior exposure to HCV may be due to antibody levels below the limit of detection of this assay or lack of reactivity to  the HCV antigens used in this assay. Patients with recent HCV infections (<3 months from time of exposure) may have false-negative HCV antibody results due to the time needed for seroconversion (average of 8 to 9 weeks).   Lipid panel reflex to direct LDL Non-fasting   Result Value Ref Range    Cholesterol 179 <200 mg/dL    Triglycerides 70 <150 mg/dL    Direct Measure HDL 46 >=40 mg/dL    LDL Cholesterol Calculated 119 (H) <=100 mg/dL    Non HDL Cholesterol 133 (H) <130 mg/dL    Patient Fasting > 8hrs? No     Narrative    Cholesterol  Desirable:  <200 mg/dL    Triglycerides  Normal:  Less than 150 mg/dL  Borderline High:  150-199 mg/dL  High:  200-499 mg/dL  Very High:  Greater than or equal to 500 mg/dL    Direct Measure HDL  Female:  Greater than or equal to 50 mg/dL   Male:  Greater than or equal to 40 mg/dL    LDL Cholesterol  Desirable:  <100mg/dL  Above Desirable:  100-129 mg/dL   Borderline High:  130-159 mg/dL   High:  160-189 mg/dL   Very High:  >= 190 mg/dL    Non HDL Cholesterol  Desirable:  130 mg/dL  Above Desirable:  130-159 mg/dL  Borderline High:  160-189 mg/dL  High:  190-219 mg/dL  Very High:  Greater than or equal to 220 mg/dL       If you have any questions or concerns, please call the clinic at the number listed above.       Sincerely,      Von Cruz MD/ ss

## 2023-12-20 NOTE — PROGRESS NOTES
Christopher Mejia is a 43 year old year old patient who comes in today for a Blood Pressure check because of new medication.  Saw Dr Cruz in clinic on 12/6/23.  Started on Zestoretic 10-12.5 mg daily.    Vital Signs as repeated by /96 and 6 minutes later 134/96.  Pulse 80 and regular     Patient is taking medication as prescribed    Patient is tolerating medications well.  Patient is a smoker.     Patient is not monitoring Blood Pressure at home.      Current complaints: none    Disposition:  will route this encounter to Dr Cruz for review  Char Flores RN

## 2024-03-19 ENCOUNTER — OFFICE VISIT (OUTPATIENT)
Dept: FAMILY MEDICINE | Facility: CLINIC | Age: 44
End: 2024-03-19
Payer: COMMERCIAL

## 2024-03-19 VITALS
HEART RATE: 74 BPM | DIASTOLIC BLOOD PRESSURE: 94 MMHG | HEIGHT: 78 IN | RESPIRATION RATE: 18 BRPM | BODY MASS INDEX: 36.45 KG/M2 | TEMPERATURE: 97.8 F | OXYGEN SATURATION: 98 % | SYSTOLIC BLOOD PRESSURE: 132 MMHG | WEIGHT: 315 LBS

## 2024-03-19 DIAGNOSIS — E66.01 MORBID OBESITY (H): ICD-10-CM

## 2024-03-19 DIAGNOSIS — I10 BENIGN ESSENTIAL HYPERTENSION: ICD-10-CM

## 2024-03-19 DIAGNOSIS — R73.03 PREDIABETES: ICD-10-CM

## 2024-03-19 LAB
ANION GAP SERPL CALCULATED.3IONS-SCNC: 12 MMOL/L (ref 7–15)
BUN SERPL-MCNC: 12 MG/DL (ref 6–20)
CALCIUM SERPL-MCNC: 9.5 MG/DL (ref 8.6–10)
CHLORIDE SERPL-SCNC: 105 MMOL/L (ref 98–107)
CREAT SERPL-MCNC: 1.17 MG/DL (ref 0.67–1.17)
DEPRECATED HCO3 PLAS-SCNC: 25 MMOL/L (ref 22–29)
EGFRCR SERPLBLD CKD-EPI 2021: 79 ML/MIN/1.73M2
GLUCOSE SERPL-MCNC: 104 MG/DL (ref 70–99)
HBA1C MFR BLD: 5.6 % (ref 0–5.6)
POTASSIUM SERPL-SCNC: 4.3 MMOL/L (ref 3.4–5.3)
SODIUM SERPL-SCNC: 142 MMOL/L (ref 135–145)

## 2024-03-19 PROCEDURE — 83036 HEMOGLOBIN GLYCOSYLATED A1C: CPT | Performed by: FAMILY MEDICINE

## 2024-03-19 PROCEDURE — 36415 COLL VENOUS BLD VENIPUNCTURE: CPT | Performed by: FAMILY MEDICINE

## 2024-03-19 PROCEDURE — 80048 BASIC METABOLIC PNL TOTAL CA: CPT | Performed by: FAMILY MEDICINE

## 2024-03-19 PROCEDURE — 99214 OFFICE O/P EST MOD 30 MIN: CPT | Performed by: FAMILY MEDICINE

## 2024-03-19 RX ORDER — LISINOPRIL AND HYDROCHLOROTHIAZIDE 12.5; 2 MG/1; MG/1
1 TABLET ORAL DAILY
Qty: 90 TABLET | Refills: 1 | Status: SHIPPED | OUTPATIENT
Start: 2024-03-19 | End: 2024-05-15

## 2024-03-19 ASSESSMENT — PAIN SCALES - GENERAL: PAINLEVEL: NO PAIN (0)

## 2024-03-19 NOTE — PROGRESS NOTES
"  Assessment & Plan     Benign essential hypertension  Blood pressure above target goal of less than 140/90.  Zestoretic increased to 20-12.5 mg.  Recommended to continue regular exercise, healthy diet and weight loss.  BMP ordered to monitor electrolytes and renal function.  Follow-up with RN in 2 weeks for repeat blood pressure check  - lisinopril-hydrochlorothiazide (ZESTORETIC) 20-12.5 MG tablet; Take 1 tablet by mouth daily  - Basic metabolic panel  (Ca, Cl, CO2, Creat, Gluc, K, Na, BUN); Future    Morbid obesity (H)  Unfortunately GLP-1 meds not covered by insurance.  Recommended to continue regular exercise, healthy diet and weight loss    Prediabetes  Hemoglobin A1c ordered  - Hemoglobin A1c; Future      Subjective   Abel is a 44 year old, presenting for the following health issues:  Hypertension    History of Present Illness       Hypertension: He presents for follow up of hypertension.  He does not check blood pressure  regularly outside of the clinic. Outside blood pressures have been over 140/90. He does not follow a low salt diet.     He eats 2-3 servings of fruits and vegetables daily.He consumes 4 sweetened beverage(s) daily.He exercises with enough effort to increase his heart rate 9 or less minutes per day.  He exercises with enough effort to increase his heart rate 3 or less days per week. He is missing 2 dose(s) of medications per week.  He is not taking prescribed medications regularly due to remembering to take.     BP Readings from Last 3 Encounters:   03/19/24 (!) 132/94   12/20/23 (!) 134/96   12/06/23 (!) 138/100       Review of Systems  Constitutional, HEENT, cardiovascular, pulmonary, gi and gu systems are negative, except as otherwise noted.        Objective    BP (!) 132/94 (Cuff Size: Adult Large)   Pulse 74   Temp 97.8  F (36.6  C) (Tympanic)   Resp 18   Ht 2.007 m (6' 7\")   Wt (!) 173.7 kg (383 lb)   SpO2 98%   BMI 43.15 kg/m    Body mass index is 43.15 kg/m .  Physical Exam "   GENERAL: alert and no distress  EYES: Eyes grossly normal to inspection, PERRL and conjunctivae and sclerae normal  NECK: no adenopathy, no asymmetry, masses, or scars  RESP: no wheezes  CV: regular rates and rhythm, normal S1 S2, no S3 or S4, and no murmur, click or rub  MS: no gross musculoskeletal defects noted, no edema  NEURO: Normal strength and tone, mentation intact and speech normal  PSYCH: mentation appears normal, affect normal/bright      Wt Readings from Last 10 Encounters:   03/19/24 (!) 173.7 kg (383 lb)   12/06/23 (!) 178.3 kg (393 lb)   11/08/23 (!) 179.6 kg (395 lb 14.4 oz)   10/25/23 (!) 176.9 kg (390 lb)   09/13/23 (!) 176 kg (388 lb)   04/07/23 (!) 184.2 kg (406 lb)   12/26/18 (!) 182 kg (401 lb 3.2 oz)   12/13/17 (!) 182.8 kg (403 lb)   08/08/17 (!) 179.6 kg (396 lb)   07/05/17 (!) 177.8 kg (392 lb)        Signed Electronically by: Von Cruz MD

## 2024-03-19 NOTE — LETTER
March 19, 2024      Abel Mejia  [address] [facility] [address]        Dear ,    We are writing to inform you of your test results.    Hemoglobin A1c 5.6, normal.     Resulted Orders   Hemoglobin A1c   Result Value Ref Range    Hemoglobin A1C 5.6 0.0 - 5.6 %      Comment:      Normal <5.7%   Prediabetes 5.7-6.4%    Diabetes 6.5% or higher     Note: Adopted from ADA consensus guidelines.       If you have any questions or concerns, please call the clinic at the number listed above.       Sincerely,      Von Cruz MD

## 2024-03-19 NOTE — NURSING NOTE
"Chief Complaint   Patient presents with    Hypertension     BP (!) 132/94 (Cuff Size: Adult Large)   Pulse 74   Temp 97.8  F (36.6  C) (Tympanic)   Resp 18   Ht 2.007 m (6' 7\")   Wt (!) 173.7 kg (383 lb)   SpO2 98%   BMI 43.15 kg/m   Estimated body mass index is 43.15 kg/m  as calculated from the following:    Height as of this encounter: 2.007 m (6' 7\").    Weight as of this encounter: 173.7 kg (383 lb).  Patient presents to the clinic using No DME      Health Maintenance that is potentially due pending provider review:    Health Maintenance Due   Topic Date Due    ADVANCE CARE PLANNING  Never done    IPV IMMUNIZATION (3 of 3 - 4-dose series) 09/22/1985    Pneumococcal Vaccine: Pediatrics (0 to 5 Years) and At-Risk Patients (6 to 64 Years) (1 of 2 - PCV) Never done    YEARLY PREVENTIVE VISIT  08/08/2018    DTAP/TDAP/TD IMMUNIZATION (5 - Td or Tdap) 03/30/2020    INFLUENZA VACCINE (1) 09/01/2023    COVID-19 Vaccine (3 - 2023-24 season) 09/01/2023                  "

## 2024-03-19 NOTE — LETTER
March 20, 2024      Abel MALATHI Jackie  [address] [facility] [address]        Dear ,    We are writing to inform you of your test results.    Electrolytes and kidney function test came back normal.     Resulted Orders   Hemoglobin A1c   Result Value Ref Range    Hemoglobin A1C 5.6 0.0 - 5.6 %      Comment:      Normal <5.7%   Prediabetes 5.7-6.4%    Diabetes 6.5% or higher     Note: Adopted from ADA consensus guidelines.   Basic metabolic panel  (Ca, Cl, CO2, Creat, Gluc, K, Na, BUN)   Result Value Ref Range    Sodium 142 135 - 145 mmol/L      Comment:      Reference intervals for this test were updated on 09/26/2023 to more accurately reflect our healthy population. There may be differences in the flagging of prior results with similar values performed with this method. Interpretation of those prior results can be made in the context of the updated reference intervals.     Potassium 4.3 3.4 - 5.3 mmol/L    Chloride 105 98 - 107 mmol/L    Carbon Dioxide (CO2) 25 22 - 29 mmol/L    Anion Gap 12 7 - 15 mmol/L    Urea Nitrogen 12.0 6.0 - 20.0 mg/dL    Creatinine 1.17 0.67 - 1.17 mg/dL    GFR Estimate 79 >60 mL/min/1.73m2    Calcium 9.5 8.6 - 10.0 mg/dL    Glucose 104 (H) 70 - 99 mg/dL       If you have any questions or concerns, please call the clinic at the number listed above.       Sincerely,      Von Cruz MD

## 2024-04-01 ENCOUNTER — DOCUMENTATION ONLY (OUTPATIENT)
Dept: FAMILY MEDICINE | Facility: CLINIC | Age: 44
End: 2024-04-01

## 2024-04-01 DIAGNOSIS — I10 BENIGN ESSENTIAL HYPERTENSION: Primary | ICD-10-CM

## 2024-04-01 NOTE — PROGRESS NOTES
Christopher MALATHI Mejia has an upcoming lab appointment:    Future Appointments   Date Time Provider Department Center   4/10/2024  8:45 AM [facility]   4/10/2024  9:00 AM [facility] TONY RN JACKELYN LAZAR     Patient is scheduled for the following lab(s): labs per appt notes, did just do labs on 3.19.24 so maybe follow up labs needed    There is no order available. Please review and place either future orders or HMPO (Review of Health Maintenance Protocol Orders), as appropriate.    There are no preventive care reminders to display for this patient.  Darshana Neal

## 2024-04-17 ENCOUNTER — ALLIED HEALTH/NURSE VISIT (OUTPATIENT)
Dept: FAMILY MEDICINE | Facility: CLINIC | Age: 44
End: 2024-04-17
Payer: COMMERCIAL

## 2024-04-17 ENCOUNTER — LAB (OUTPATIENT)
Dept: LAB | Facility: CLINIC | Age: 44
End: 2024-04-17
Payer: COMMERCIAL

## 2024-04-17 VITALS — DIASTOLIC BLOOD PRESSURE: 92 MMHG | HEART RATE: 92 BPM | SYSTOLIC BLOOD PRESSURE: 140 MMHG

## 2024-04-17 DIAGNOSIS — I10 BENIGN ESSENTIAL HYPERTENSION: Primary | ICD-10-CM

## 2024-04-17 DIAGNOSIS — I10 BENIGN ESSENTIAL HYPERTENSION: ICD-10-CM

## 2024-04-17 LAB
ANION GAP SERPL CALCULATED.3IONS-SCNC: 8 MMOL/L (ref 7–15)
BUN SERPL-MCNC: 12.6 MG/DL (ref 6–20)
CALCIUM SERPL-MCNC: 8.9 MG/DL (ref 8.6–10)
CHLORIDE SERPL-SCNC: 106 MMOL/L (ref 98–107)
CREAT SERPL-MCNC: 0.97 MG/DL (ref 0.67–1.17)
DEPRECATED HCO3 PLAS-SCNC: 26 MMOL/L (ref 22–29)
EGFRCR SERPLBLD CKD-EPI 2021: >90 ML/MIN/1.73M2
GLUCOSE SERPL-MCNC: 106 MG/DL (ref 70–99)
POTASSIUM SERPL-SCNC: 4.2 MMOL/L (ref 3.4–5.3)
SODIUM SERPL-SCNC: 140 MMOL/L (ref 135–145)

## 2024-04-17 PROCEDURE — 99207 PR NO CHARGE NURSE ONLY: CPT

## 2024-04-17 PROCEDURE — 80048 BASIC METABOLIC PNL TOTAL CA: CPT

## 2024-04-17 PROCEDURE — 36415 COLL VENOUS BLD VENIPUNCTURE: CPT

## 2024-04-17 NOTE — PROGRESS NOTES
Christopher Mejia is a 44 year old year old patient who comes in today for labs and a Blood Pressure check because of medication change and ongoing blood pressure monitoring.  Vital Signs as repeated by /94 P 92, 140/92   Patient is taking medication as prescribed  Patient is tolerating medications well.  Patient is monitoring Blood Pressure at home.  Average readings if yes are 154//110 at times. He does not think his meter is correct, it might not fit correctly to his upper arm.   Current complaints: none  Disposition:  patient to continue with the same medication and Try checking his BP on the forearm for a better fit. I did schedule him for a nurse only BP check again in one month and advised he bring his meter in for comparison.   BP Readings from Last 3 Encounters:   04/17/24 (!) 158/94   03/19/24 (!) 132/94   12/20/23 (!) 134/96     Christianne BECKMAN RN

## 2024-04-24 NOTE — TELEPHONE ENCOUNTER
Patient informed of  Dr Cruz's message via voicemail.   Christianne BECKMAN RN    Please recommend patient to use over-the-counter multivitamin.    Dr Cruz

## 2024-05-15 ENCOUNTER — TELEPHONE (OUTPATIENT)
Dept: FAMILY MEDICINE | Facility: CLINIC | Age: 44
End: 2024-05-15

## 2024-05-15 ENCOUNTER — ALLIED HEALTH/NURSE VISIT (OUTPATIENT)
Dept: FAMILY MEDICINE | Facility: CLINIC | Age: 44
End: 2024-05-15
Payer: COMMERCIAL

## 2024-05-15 VITALS — SYSTOLIC BLOOD PRESSURE: 142 MMHG | DIASTOLIC BLOOD PRESSURE: 88 MMHG | HEART RATE: 90 BPM

## 2024-05-15 DIAGNOSIS — I10 BENIGN ESSENTIAL HYPERTENSION: Primary | ICD-10-CM

## 2024-05-15 PROCEDURE — 99207 PR NO CHARGE NURSE ONLY: CPT

## 2024-05-15 RX ORDER — LOSARTAN POTASSIUM AND HYDROCHLOROTHIAZIDE 12.5; 5 MG/1; MG/1
1 TABLET ORAL DAILY
Qty: 90 TABLET | Refills: 1 | Status: SHIPPED | OUTPATIENT
Start: 2024-05-15

## 2024-05-15 NOTE — TELEPHONE ENCOUNTER
Christopher Mejia is a 44 year old year old patient who comes in today for a Blood Pressure check because of new medication.  Vital Signs as repeated by RN /88 P 90.  Patient is not taking medication as prescribed  Patient is not tolerating medications well.  Patient is monitoring Blood Pressure at home.  Average readings if yes are 155/99 this AM on home   Current complaints: cough constant tickle in his throat, patient reports he is taking the zestoretic Q 2-3 days due to adverse side effects, patient is requesting a different BP medication.  Disposition:  BP readings routed to Dr. Cruz for review and request for a new blood pressure medication as patient is unable to tolerate the dry cough. Patient uses [facility] in [place].     Julie Behrendt RN

## 2024-05-15 NOTE — PROGRESS NOTES
Christopher Mejia is a 44 year old year old patient who comes in today for a Blood Pressure check because of new medication.  Vital Signs as repeated by RN /88 P 90.  Patient is not taking medication as prescribed  Patient is not tolerating medications well.  Patient is monitoring Blood Pressure at home.  Average readings if yes are 155/99 this AM on home   Current complaints: cough constant tickle in his throat, patient reports he is taking the zestoretic Q 2-3 days due to adverse side effects, patient is requesting a different BP medication.  Disposition:  BP readings routed to Dr. Cruz for review and request for a new blood pressure medication as patient is unable to tolerate the dry cough. Patient uses [facility] in [place].     Julie Behrendt RN

## 2024-05-15 NOTE — TELEPHONE ENCOUNTER
Left message for patient to return call to clinic and ask to speak with a nurse.     Viktoria Bailey RN

## 2024-05-15 NOTE — TELEPHONE ENCOUNTER
Patient is requesting an appointment for a closed fracture of her right ankle. Please advise.    Urgent Care/ER Location: [facility]  Date of visit: 10/7/20 (Originally) 6/4/21 (Referral)  Date of Injury: 10/7/20  What caused injury: Slipped on ladder - heard crack - did not fall  Diagnosis from urgent care: Closed fracture of distal end of right fibula with routine healing, unspecified fracture morphology, subsequent encounter    Images (yes/no): Yes    Images in system or will patient bring disc: In system     Prior surgery: No  Work Related: Yes  Verified number to reach patient: [phone]           Von Cruz MD  [facility] Pool3 hours ago (9:38 AM)     [place]  Will recommend to discontinue Zestoretic.  Hyzaar prescription sent to patient's pharmacy.  Follow-up in 2 weeks or earlier if needed    Dr Cruz

## 2024-05-23 NOTE — TELEPHONE ENCOUNTER
Call placed to patient. Relayed provider instructions. Patient will  Hyzaar today. He is not sure of his schedule in the next two weeks so will call back to make appointment.     Viktoria Bailey RN

## 2024-09-20 ENCOUNTER — TELEPHONE (OUTPATIENT)
Dept: FAMILY MEDICINE | Facility: CLINIC | Age: 44
End: 2024-09-20
Payer: COMMERCIAL

## 2024-09-20 NOTE — LETTER
September 20, 2024    To  Christopher Mejia  [address] [facility] [address]    Your team at [facility] cares about your health. We have reviewed your chart and based on our findings; we are making the following recommendations to better manage your health.     You are in particular need of attention regarding the following:     HYPERTENSION FOLLOW UP: Office Visit  PREVENTATIVE VISIT: Physical    If you have already completed these items, please contact the clinic via phone or   MyChart so your care team can review and update your records. Thank you for   choosing [facility] Clinics for your healthcare needs. For any questions,   concerns, or to schedule an appointment please contact our clinic.    Healthy Regards,      Your [facility] Care Team

## 2024-09-20 NOTE — TELEPHONE ENCOUNTER
Patient Quality Outreach    Patient is due for the following:   Hypertension -  Hypertension follow-up visit  Physical Preventive Adult Physical    Next Steps:   Schedule a Adult Preventative    Type of outreach:    Sent letter.      Questions for provider review:    None           Alisa Dunn MA

## 2024-11-26 ENCOUNTER — TELEPHONE (OUTPATIENT)
Dept: FAMILY MEDICINE | Facility: CLINIC | Age: 44
End: 2024-11-26
Payer: COMMERCIAL

## 2024-11-26 NOTE — LETTER
November 26, 2024    To  Christopher Mejia  [address] [address]    Your team at [facility] cares about your health. We have reviewed your chart and based on our findings; we are making the following recommendations to better manage your health.     You are in particular need of attention regarding the following:     HYPERTENSION FOLLOW UP: Blood pressure check with nurse  PREVENTATIVE VISIT: Physical    If you have already completed these items, please contact the clinic via phone or   MyChart so your care team can review and update your records. Thank you for   choosing [facility] Clinics for your healthcare needs. For any questions,   concerns, or to schedule an appointment please contact our clinic.    Healthy Regards,      Your [facility] Care Team

## 2024-11-26 NOTE — TELEPHONE ENCOUNTER
Patient Quality Outreach    Patient is due for the following:   Hypertension -  BP check  Physical Preventive Adult Physical    Action(s) Taken:   Schedule a Adult Preventative    Type of outreach:    Sent letter.    Questions for provider review:               Alisa Dunn MA

## 2024-12-12 ENCOUNTER — TELEPHONE (OUTPATIENT)
Dept: FAMILY MEDICINE | Facility: CLINIC | Age: 44
End: 2024-12-12
Payer: COMMERCIAL

## 2025-01-21 DIAGNOSIS — I10 BENIGN ESSENTIAL HYPERTENSION: ICD-10-CM

## 2025-01-22 RX ORDER — LOSARTAN POTASSIUM AND HYDROCHLOROTHIAZIDE 12.5; 5 MG/1; MG/1
1 TABLET ORAL DAILY
Qty: 90 TABLET | Refills: 0 | Status: SHIPPED | OUTPATIENT
Start: 2025-01-22

## 2025-01-22 RX ORDER — LOSARTAN POTASSIUM AND HYDROCHLOROTHIAZIDE 12.5; 5 MG/1; MG/1
1 TABLET ORAL DAILY
Qty: 90 TABLET | Refills: 0 | OUTPATIENT
Start: 2025-01-22

## 2025-01-22 NOTE — TELEPHONE ENCOUNTER
Called and spoke with pt. Appt scheduled for 1/29. Pt is currently out of medication, did not realize he was out of refills. Wondering if bridge refill can be sent.    Kelin Valenzuela Patient

## 2025-01-22 NOTE — TELEPHONE ENCOUNTER
Claudia refill given x 1, patient has upcoming appt 1/29/25 with Dr. Koko Mims.   Prescription approved per [facility] Refill Protocol.  Julie Behrendt RN

## 2025-01-22 NOTE — ADDENDUM NOTE
Addended by: BEHRENDT, JULIE A on: 1/22/2025 03:33 PM     Modules accepted: Orders     Calling with INR results

## 2025-01-22 NOTE — TELEPHONE ENCOUNTER
Overdue for needed care. Please call to schedule. Once appt is scheduled, route back to med refill pool.    Julie Behrendt RN

## 2025-02-05 ENCOUNTER — OFFICE VISIT (OUTPATIENT)
Dept: FAMILY MEDICINE | Facility: CLINIC | Age: 45
End: 2025-02-05
Payer: COMMERCIAL

## 2025-02-05 VITALS
SYSTOLIC BLOOD PRESSURE: 142 MMHG | BODY MASS INDEX: 36.45 KG/M2 | TEMPERATURE: 98.6 F | RESPIRATION RATE: 24 BRPM | HEART RATE: 88 BPM | HEIGHT: 78 IN | DIASTOLIC BLOOD PRESSURE: 102 MMHG | OXYGEN SATURATION: 99 % | WEIGHT: 315 LBS

## 2025-02-05 DIAGNOSIS — Z00.00 ROUTINE HISTORY AND PHYSICAL EXAMINATION OF ADULT: Primary | ICD-10-CM

## 2025-02-05 DIAGNOSIS — R73.03 PREDIABETES: ICD-10-CM

## 2025-02-05 DIAGNOSIS — E66.01 MORBID OBESITY (H): ICD-10-CM

## 2025-02-05 DIAGNOSIS — Z72.0 TOBACCO ABUSE: ICD-10-CM

## 2025-02-05 DIAGNOSIS — I10 BENIGN ESSENTIAL HYPERTENSION: ICD-10-CM

## 2025-02-05 LAB
ANION GAP SERPL CALCULATED.3IONS-SCNC: 10 MMOL/L (ref 7–15)
BUN SERPL-MCNC: 18.5 MG/DL (ref 6–20)
CALCIUM SERPL-MCNC: 9.9 MG/DL (ref 8.8–10.4)
CHLORIDE SERPL-SCNC: 104 MMOL/L (ref 98–107)
CHOLEST SERPL-MCNC: 173 MG/DL
CREAT SERPL-MCNC: 1.16 MG/DL (ref 0.67–1.17)
EGFRCR SERPLBLD CKD-EPI 2021: 80 ML/MIN/1.73M2
EST. AVERAGE GLUCOSE BLD GHB EST-MCNC: 123 MG/DL
FASTING STATUS PATIENT QL REPORTED: NO
FASTING STATUS PATIENT QL REPORTED: NO
GLUCOSE SERPL-MCNC: 101 MG/DL (ref 70–99)
HBA1C MFR BLD: 5.9 % (ref 0–5.6)
HCO3 SERPL-SCNC: 27 MMOL/L (ref 22–29)
HDLC SERPL-MCNC: 46 MG/DL
LDLC SERPL CALC-MCNC: 104 MG/DL
NONHDLC SERPL-MCNC: 127 MG/DL
POTASSIUM SERPL-SCNC: 4.5 MMOL/L (ref 3.4–5.3)
SODIUM SERPL-SCNC: 141 MMOL/L (ref 135–145)
TRIGL SERPL-MCNC: 113 MG/DL

## 2025-02-05 PROCEDURE — 80061 LIPID PANEL: CPT | Performed by: FAMILY MEDICINE

## 2025-02-05 PROCEDURE — 83036 HEMOGLOBIN GLYCOSYLATED A1C: CPT | Performed by: FAMILY MEDICINE

## 2025-02-05 PROCEDURE — 99214 OFFICE O/P EST MOD 30 MIN: CPT | Mod: 25 | Performed by: FAMILY MEDICINE

## 2025-02-05 PROCEDURE — G2211 COMPLEX E/M VISIT ADD ON: HCPCS | Performed by: FAMILY MEDICINE

## 2025-02-05 PROCEDURE — 80048 BASIC METABOLIC PNL TOTAL CA: CPT | Performed by: FAMILY MEDICINE

## 2025-02-05 PROCEDURE — 36415 COLL VENOUS BLD VENIPUNCTURE: CPT | Performed by: FAMILY MEDICINE

## 2025-02-05 PROCEDURE — 99396 PREV VISIT EST AGE 40-64: CPT | Performed by: FAMILY MEDICINE

## 2025-02-05 SDOH — HEALTH STABILITY: PHYSICAL HEALTH: ON AVERAGE, HOW MANY MINUTES DO YOU ENGAGE IN EXERCISE AT THIS LEVEL?: 10 MIN

## 2025-02-05 SDOH — HEALTH STABILITY: PHYSICAL HEALTH: ON AVERAGE, HOW MANY DAYS PER WEEK DO YOU ENGAGE IN MODERATE TO STRENUOUS EXERCISE (LIKE A BRISK WALK)?: 1 DAY

## 2025-02-05 ASSESSMENT — SOCIAL DETERMINANTS OF HEALTH (SDOH): HOW OFTEN DO YOU GET TOGETHER WITH FRIENDS OR RELATIVES?: NEVER

## 2025-02-05 ASSESSMENT — PAIN SCALES - GENERAL: PAINLEVEL_OUTOF10: NO PAIN (0)

## 2025-02-05 NOTE — PROGRESS NOTES
"Preventive Care Visit  [facility]  Von Cruz MD, Family Medicine  Feb 5, 2025      Assessment & Plan     Routine history and physical examination of adult  Blood pressure above target goal of less than 140/90.  Management options discussed.  Saxenda prescribed for weight loss, hopefully will be helpful for blood pressure control as well.  Stressed on regular exercise, healthy diet.  Patient deferred routine vaccines.  Written information provided    Benign essential hypertension  As above.  Will continue Hyzaar 50-12.5 mg for now.  - Basic metabolic panel  (Ca, Cl, CO2, Creat, Gluc, K, Na, BUN); Future  - Lipid panel; Future    Morbid obesity (H)  As above  - liraglutide - Weight Management (SAXENDA) 18 MG/3ML pen; Inject 0.6 mg subcutaneously daily for 7 days, THEN 1.2 mg daily for 7 days, THEN 1.8 mg daily for 7 days, THEN 2.4 mg daily for 7 days, THEN 3 mg daily.    Tobacco abuse  Smoking cessation counseling provided, associated health hazards explained in detail    Prediabetes  - Hemoglobin A1c; Future      Nicotine/Tobacco Cessation  He reports that he has been smoking cigarettes. He has never used smokeless tobacco.  Nicotine/Tobacco Cessation Plan  Information offered: Patient not interested at this time      BMI  Estimated body mass index is 45.11 kg/m  as calculated from the following:    Height as of this encounter: 2.007 m (6' 7\").    Weight as of this encounter: 181.6 kg (400 lb 6.4 oz).   Weight management plan: Discussed healthy diet and exercise guidelines    Counseling  Appropriate preventive services were addressed with this patient via screening, questionnaire, or discussion as appropriate for fall prevention, nutrition, physical activity, Tobacco-use cessation, social engagement, weight loss and cognition.  Checklist reviewing preventive services available has been given to the patient.  Reviewed patient's diet, addressing concerns and/or questions.   He is at " risk for lack of exercise and has been provided with information to increase physical activity for the benefit of his well-being.   Patient is at risk for social isolation and has been provided with information about the benefit of social connection.   The patient was instructed to see the dentist every 6 months.       Bridgette Roman is a 44 year old, presenting for the following:  Physical and Hypertension        2/5/2025     1:05 PM   Additional Questions   Roomed by Namita HOLLOWAY CMA          [facility]      Hypertension Follow-up  Do you check your blood pressure regularly outside of the clinic? Yes   Are you following a low salt diet? No  Are your blood pressures ever more than 140 on the top number (systolic) OR more   than 90 on the bottom number (diastolic), for example 140/90? Yes-occasionally        2/5/2025   General Health   How would you rate your overall physical health? Good   Feel stress (tense, anxious, or unable to sleep) Not at all         2/5/2025   Nutrition   Three or more servings of calcium each day? Yes   Diet: Regular (no restrictions)   How many servings of fruit and vegetables per day? (!) 0-1   How many sweetened beverages each day? (!) 3         2/5/2025   Exercise   Days per week of moderate/strenous exercise 1 day   Average minutes spent exercising at this level 10 min   (!) EXERCISE CONCERN      2/5/2025   Social Factors   Frequency of gathering with friends or relatives Never   Worry food won't last until get money to buy more No   Food not last or not have enough money for food? No   Do you have housing? (Housing is defined as stable permanent housing and does not include staying ouside in a car, in a tent, in an abandoned building, in an overnight shelter, or couch-surfing.) Yes   Are you worried about losing your housing? No   Lack of transportation? No   Unable to get utilities (heat,electricity)? No   (!) SOCIAL CONNECTIONS CONCERN      2/5/2025   Dental   Dentist two times every year?  (!) NO            Today's PHQ-2 Score:       2/5/2025    12:57 PM   PHQ-2 ( 1999 [employer])   Q1: Little interest or pleasure in doing things 0   Q2: Feeling down, depressed or hopeless 0   PHQ-2 Score 0    Q1: Little interest or pleasure in doing things Not at all   Q2: Feeling down, depressed or hopeless Not at all   PHQ-2 Score 0       Patient-reported           2/5/2025   Substance Use   Alcohol more than 3/day or more than 7/wk Not Applicable   Do you use any other substances recreationally? No     Social History     Tobacco Use    Smoking status: Every Day     Current packs/day: 1.00     Types: Cigarettes    Smokeless tobacco: Never   Vaping Use    Vaping status: Never Used   Substance Use Topics    Alcohol use: Yes    Drug use: No           2/5/2025   STI Screening   New sexual partner(s) since last STI/HIV test? No   ASCVD Risk   The 10-year ASCVD risk score (Brien MALIK, et al., 2019) is: 6.5%    Values used to calculate the score:      Age: 44 years      Sex: Male      Is Non- : No      Diabetic: No      Tobacco smoker: Yes      Systolic Blood Pressure: 142 mmHg      Is BP treated: Yes      HDL Cholesterol: 46 mg/dL      Total Cholesterol: 179 mg/dL        2/5/2025   Contraception/Family Planning   Questions about contraception or family planning No        Reviewed and updated as needed this visit by Provider                    Past Medical History:   Diagnosis Date    Dyspepsia      Past Surgical History:   Procedure Laterality Date    APPENDECTOMY  1989     OB History   No obstetric history on file.     Lab work is in process  Labs reviewed in EPIC  BP Readings from Last 3 Encounters:   02/05/25 (!) 142/102   05/15/24 (!) 142/88   04/17/24 (!) 140/92    Wt Readings from Last 3 Encounters:   02/05/25 (!) 181.6 kg (400 lb 6.4 oz)   03/19/24 (!) 173.7 kg (383 lb)   12/06/23 (!) 178.3 kg (393 lb)                  Patient Active Problem List   Diagnosis    Body mass index 40 and  over, adult    CARDIOVASCULAR SCREENING; LDL GOAL LESS THAN 130    ELO (obstructive sleep apnea)    Gastroesophageal reflux disease without esophagitis    Morbid obesity (H)    Tobacco dependence    Benign essential hypertension    Acute midline low back pain without sciatica    Closed compression fracture of body of L1 vertebra (H)     Past Surgical History:   Procedure Laterality Date    APPENDECTOMY  1989       Social History     Tobacco Use    Smoking status: Every Day     Current packs/day: 1.00     Types: Cigarettes    Smokeless tobacco: Never   Substance Use Topics    Alcohol use: Yes     Family History   Problem Relation Age of Onset    Cancer Mother         thyroid    Respiratory Paternal Grandmother     Cardiovascular Paternal Grandmother     Cancer Paternal Grandfather          Current Outpatient Medications   Medication Sig Dispense Refill    acetaminophen (TYLENOL) 325 MG tablet Take 325-650 mg by mouth every 6 hours as needed for mild pain      liraglutide - Weight Management (SAXENDA) 18 MG/3ML pen Inject 0.6 mg subcutaneously daily for 7 days, THEN 1.2 mg daily for 7 days, THEN 1.8 mg daily for 7 days, THEN 2.4 mg daily for 7 days, THEN 3 mg daily. 38 mL 0    losartan-hydrochlorothiazide (HYZAAR) 50-12.5 MG tablet Take 1 tablet by mouth daily. 90 tablet 0    UNKNOWN MED DOSAGE Take 2-3 tablets by mouth daily       Allergies   Allergen Reactions    Amoxicillin Hives    Lisinopril Cough     Recent Labs   Lab Test 04/17/24  1330 03/19/24  0832 12/20/23  0843 10/25/23  1105 10/25/23  1105 05/11/20  1416   A1C  --  5.6  --   --  5.9*  --    LDL  --   --  119*  --   --   --    HDL  --   --  46  --   --   --    TRIG  --   --  70  --   --   --    CR 0.97 1.17 1.00   < > 1.02 1.00   GFRESTIMATED >90 79 >90   < > >90 >90   GFRESTBLACK  --   --   --   --   --  >90   POTASSIUM 4.2 4.3 4.5   < > 4.4 4.0   TSH  --   --   --   --  1.05  --     < > = values in this interval not displayed.          Review of  "Systems  Constitutional, neuro, ENT, endocrine, pulmonary, cardiac, gastrointestinal, genitourinary, musculoskeletal, integument and psychiatric systems are negative, except as otherwise noted.     Objective    Exam  BP (!) 142/102 (BP Location: Right arm, Patient Position: Sitting, Cuff Size: Adult Large)   Pulse 88   Temp 98.6  F (37  C) (Tympanic)   Resp 24   Ht 2.007 m (6' 7\")   Wt (!) 181.6 kg (400 lb 6.4 oz)   SpO2 99%   BMI 45.11 kg/m     Estimated body mass index is 45.11 kg/m  as calculated from the following:    Height as of this encounter: 2.007 m (6' 7\").    Weight as of this encounter: 181.6 kg (400 lb 6.4 oz).    Physical Exam  GENERAL: alert and no distress  EYES: Eyes grossly normal to inspection, PERRL and conjunctivae and sclerae normal  HENT: normal cephalic/atraumatic, nose and mouth without ulcers or lesions, oropharynx clear, and oral mucous membranes moist  NECK: no adenopathy, no asymmetry, masses, or scars  RESP: lungs clear to auscultation - no rales, rhonchi or wheezes  CV: regular rate and rhythm, normal S1 S2, no S3 or S4, no murmur, click or rub, no peripheral edema  ABDOMEN: soft, nontender, no hepatosplenomegaly, no masses  MS: no gross musculoskeletal defects noted, no edema  SKIN: no suspicious lesions or rashes  NEURO: Normal strength and tone, mentation intact and speech normal  PSYCH: mentation appears normal, affect normal/bright        Signed Electronically by: Von Cruz MD    "

## 2025-02-07 ENCOUNTER — TELEPHONE (OUTPATIENT)
Dept: FAMILY MEDICINE | Facility: CLINIC | Age: 45
End: 2025-02-07
Payer: COMMERCIAL

## 2025-02-07 NOTE — TELEPHONE ENCOUNTER
"Prior Authorization Retail Medication Request    Medication/Dose:saxenda 18mg  Diagnosis and ICD code (if different than what is on RX):     New/renewal/insurance change PA/secondary ins. PA:  Previously Tried and Failed:     Rationale:     Estimated body mass index is 45.11 kg/m  as calculated from the following:    Height as of this encounter: 2.007 m (6' 7\").    Weight as of this encounter: 181.6 kg (400 lb 6.4 oz).   Weight management plan: Discussed healthy diet and exercise guidelines      Covermymeds key:   [facility]    Clinic Information  Preferred routing pool for dept communication:  [id number]    Coco Hector/ Patient     "

## 2025-02-11 NOTE — TELEPHONE ENCOUNTER
Retail Pharmacy Prior Authorization Team   Phone: [phone]    PA Initiation    Medication: SAXENDA 18 MG/3ML SC SOPN  Insurance Company: [employer] - Phone [phone] Fax [phone]  Pharmacy Filling the Rx: [id number] - [place], [place] - [address]  Filling Pharmacy Phone: [phone]  Filling Pharmacy Fax:    Start Date: 2/11/2025        Note: Due to record-high volumes, our turn-around time is taking longer than usual . We are currently 3-4  business days behind in the pools.   We are working diligently to submit all requests in a timely manner and in the order they are received. Please only flag TRUE URGENT requests as high priority to the pool at this time.   If you have questions on status of PA's,  please send a note/message in the active PA encounter and send back to the [facility] PA pool [[id number]].    If you have questions about the turn-around time or about our process, please reach out to our supervisor Alisa Li.   Thank you!   RPPA (Retail Pharmacy Prior Authorization) team

## 2025-02-12 NOTE — TELEPHONE ENCOUNTER
Retail Pharmacy Prior Authorization Team   Phone: [phone]    Prior Authorization Approval    Medication: SAXENDA 18 MG/3ML SC SOPN  Authorization Effective Date: 1/13/2025  Authorization Expiration Date: 2/12/2026    Insurance Company: [employer] - Phone [phone] Fax [phone]  Which Pharmacy is filling the prescription: [id number] - [place], [place] - [address] [place] [address]  Pharmacy Notified: YES  Patient Notified: **Instructed pharmacy to notify patient when script is ready to /ship.**

## 2025-04-29 ENCOUNTER — PATIENT OUTREACH (OUTPATIENT)
Dept: FAMILY MEDICINE | Facility: CLINIC | Age: 45
End: 2025-04-29
Payer: COMMERCIAL

## 2025-04-29 NOTE — LETTER
April 29, 2025      Christopher Mejia  [address] [address]        Dear Christopher,     April 29, 2025    Christopher Mejia    [address] [address]    Hello,     Your care team at [facility] values your health and well-being. After reviewing your chart, we have identified recommendation(s) to help you better manage your health.    It's time for a follow-up visit to manage your Hypertension    This can either be a Nurse only visit or an office visit with Dr. Cruz.   If you have a blood pressure cuff at home and are completing blood pressures at home you are able to call the clinic with updated blood pressure instead of coming into clinic if that works better for you.  Phone number for the clinic is [phone]  We look forward to seeing you at your upcoming visit.    If you have any questions or concerns, please contact our clinic. Thank you for continuing to trust us with your care.    Sincerely,    Your [facility] Care Team  Sincerely,        Von Cruz MD

## 2025-04-29 NOTE — TELEPHONE ENCOUNTER
Patient Quality Outreach    Patient is due for the following:   Hypertension -  BP check    Action(s) Taken:   Schedule a nurse only visit for Blood pressure check    Type of outreach:    Sent letter.    Questions for provider review:    None         Alisa Dunn MA

## 2025-05-05 DIAGNOSIS — I10 BENIGN ESSENTIAL HYPERTENSION: ICD-10-CM

## 2025-05-05 RX ORDER — LOSARTAN POTASSIUM AND HYDROCHLOROTHIAZIDE 12.5; 5 MG/1; MG/1
1 TABLET ORAL DAILY
Qty: 90 TABLET | Refills: 2 | Status: SHIPPED | OUTPATIENT
Start: 2025-05-05